# Patient Record
Sex: MALE | Race: WHITE | Employment: FULL TIME | URBAN - METROPOLITAN AREA
[De-identification: names, ages, dates, MRNs, and addresses within clinical notes are randomized per-mention and may not be internally consistent; named-entity substitution may affect disease eponyms.]

---

## 2018-06-21 ENCOUNTER — HOSPITAL ENCOUNTER (EMERGENCY)
Age: 28
Discharge: HOME | End: 2018-06-21

## 2018-06-21 VITALS
RESPIRATION RATE: 18 BRPM | TEMPERATURE: 97.9 F | HEART RATE: 73 BPM | DIASTOLIC BLOOD PRESSURE: 76 MMHG | WEIGHT: 126.06 LBS | BODY MASS INDEX: 18.67 KG/M2 | OXYGEN SATURATION: 99 % | HEIGHT: 69 IN | SYSTOLIC BLOOD PRESSURE: 120 MMHG

## 2018-06-21 DIAGNOSIS — R30.0 DYSURIA: ICD-10-CM

## 2018-06-21 DIAGNOSIS — Z20.2 EXPOSURE TO STD: Primary | ICD-10-CM

## 2018-06-21 LAB
BACTERIA: ABNORMAL /HPF
BILIRUBIN URINE: NEGATIVE
BLOOD, URINE: ABNORMAL
CLARITY: CLEAR
COLOR: YELLOW
GLUCOSE URINE: NEGATIVE MG/DL
KETONES, URINE: NEGATIVE MG/DL
LEUKOCYTE ESTERASE, URINE: ABNORMAL
NITRITE, URINE: NEGATIVE
PH UA: 6 (ref 5–9)
PROTEIN UA: NEGATIVE MG/DL
RBC UA: ABNORMAL /HPF (ref 0–2)
SPECIFIC GRAVITY UA: 1.02 (ref 1–1.03)
UROBILINOGEN, URINE: 0.2 E.U./DL
WBC UA: ABNORMAL /HPF (ref 0–5)

## 2018-06-21 PROCEDURE — 99283 EMERGENCY DEPT VISIT LOW MDM: CPT

## 2018-06-21 PROCEDURE — 87088 URINE BACTERIA CULTURE: CPT

## 2018-06-21 PROCEDURE — 87491 CHLMYD TRACH DNA AMP PROBE: CPT

## 2018-06-21 PROCEDURE — 6360000002 HC RX W HCPCS: Performed by: NURSE PRACTITIONER

## 2018-06-21 PROCEDURE — 6370000000 HC RX 637 (ALT 250 FOR IP): Performed by: NURSE PRACTITIONER

## 2018-06-21 PROCEDURE — 87591 N.GONORRHOEAE DNA AMP PROB: CPT

## 2018-06-21 PROCEDURE — 81001 URINALYSIS AUTO W/SCOPE: CPT

## 2018-06-21 PROCEDURE — 96372 THER/PROPH/DIAG INJ SC/IM: CPT

## 2018-06-21 PROCEDURE — 2500000003 HC RX 250 WO HCPCS: Performed by: NURSE PRACTITIONER

## 2018-06-21 RX ORDER — AZITHROMYCIN 250 MG/1
1000 TABLET, FILM COATED ORAL ONCE
Status: COMPLETED | OUTPATIENT
Start: 2018-06-21 | End: 2018-06-21

## 2018-06-21 RX ADMIN — AZITHROMYCIN 1000 MG: 250 TABLET, FILM COATED ORAL at 12:08

## 2018-06-21 RX ADMIN — LIDOCAINE HYDROCHLORIDE 250 MG: 10 INJECTION, SOLUTION INFILTRATION; PERINEURAL at 12:09

## 2018-06-23 LAB — URINE CULTURE, ROUTINE: NORMAL

## 2018-06-25 LAB
CHLAMYDIA TRACHOMATIS AMPLIFIED DET: NORMAL
N GONORRHOEAE AMPLIFIED DET: NORMAL

## 2021-08-06 ENCOUNTER — APPOINTMENT (OUTPATIENT)
Dept: GENERAL RADIOLOGY | Age: 31
End: 2021-08-06
Payer: COMMERCIAL

## 2021-08-06 ENCOUNTER — HOSPITAL ENCOUNTER (EMERGENCY)
Age: 31
Discharge: HOME | End: 2021-08-06
Payer: COMMERCIAL

## 2021-08-06 VITALS
OXYGEN SATURATION: 98 % | WEIGHT: 128 LBS | BODY MASS INDEX: 18.96 KG/M2 | SYSTOLIC BLOOD PRESSURE: 123 MMHG | DIASTOLIC BLOOD PRESSURE: 78 MMHG | TEMPERATURE: 97.3 F | HEART RATE: 85 BPM | RESPIRATION RATE: 16 BRPM | HEIGHT: 69 IN

## 2021-08-06 DIAGNOSIS — R07.89 ATYPICAL CHEST PAIN: Primary | ICD-10-CM

## 2021-08-06 LAB
ANION GAP SERPL CALCULATED.3IONS-SCNC: 19 MMOL/L (ref 7–16)
BUN BLDV-MCNC: 15 MG/DL (ref 6–20)
CALCIUM SERPL-MCNC: 10.2 MG/DL (ref 8.6–10.2)
CHLORIDE BLD-SCNC: 98 MMOL/L (ref 98–107)
CO2: 21 MMOL/L (ref 22–29)
CREAT SERPL-MCNC: 1.5 MG/DL (ref 0.7–1.2)
EKG ATRIAL RATE: 104 BPM
EKG P AXIS: 82 DEGREES
EKG P-R INTERVAL: 152 MS
EKG Q-T INTERVAL: 372 MS
EKG QRS DURATION: 100 MS
EKG QTC CALCULATION (BAZETT): 489 MS
EKG R AXIS: 102 DEGREES
EKG T AXIS: 73 DEGREES
EKG VENTRICULAR RATE: 104 BPM
GFR AFRICAN AMERICAN: >60
GFR NON-AFRICAN AMERICAN: 55 ML/MIN/1.73
GLUCOSE BLD-MCNC: 127 MG/DL (ref 74–99)
HCT VFR BLD CALC: 47.6 % (ref 37–54)
HEMOGLOBIN: 16.1 G/DL (ref 12.5–16.5)
MCH RBC QN AUTO: 29.2 PG (ref 26–35)
MCHC RBC AUTO-ENTMCNC: 33.8 % (ref 32–34.5)
MCV RBC AUTO: 86.4 FL (ref 80–99.9)
PDW BLD-RTO: 13.2 FL (ref 11.5–15)
PLATELET # BLD: 305 E9/L (ref 130–450)
PMV BLD AUTO: 9.9 FL (ref 7–12)
POTASSIUM SERPL-SCNC: 3 MMOL/L (ref 3.5–5)
RBC # BLD: 5.51 E12/L (ref 3.8–5.8)
SODIUM BLD-SCNC: 138 MMOL/L (ref 132–146)
TROPONIN, HIGH SENSITIVITY: <6 NG/L (ref 0–11)
WBC # BLD: 10.8 E9/L (ref 4.5–11.5)

## 2021-08-06 PROCEDURE — 85027 COMPLETE CBC AUTOMATED: CPT

## 2021-08-06 PROCEDURE — 93010 ELECTROCARDIOGRAM REPORT: CPT | Performed by: INTERNAL MEDICINE

## 2021-08-06 PROCEDURE — 80048 BASIC METABOLIC PNL TOTAL CA: CPT

## 2021-08-06 PROCEDURE — 99284 EMERGENCY DEPT VISIT MOD MDM: CPT

## 2021-08-06 PROCEDURE — 6360000002 HC RX W HCPCS: Performed by: PHYSICIAN ASSISTANT

## 2021-08-06 PROCEDURE — 84484 ASSAY OF TROPONIN QUANT: CPT

## 2021-08-06 PROCEDURE — 93005 ELECTROCARDIOGRAM TRACING: CPT | Performed by: EMERGENCY MEDICINE

## 2021-08-06 PROCEDURE — 96374 THER/PROPH/DIAG INJ IV PUSH: CPT

## 2021-08-06 PROCEDURE — 71045 X-RAY EXAM CHEST 1 VIEW: CPT

## 2021-08-06 RX ORDER — LORAZEPAM 2 MG/ML
1 INJECTION INTRAMUSCULAR ONCE
Status: COMPLETED | OUTPATIENT
Start: 2021-08-06 | End: 2021-08-06

## 2021-08-06 RX ORDER — ACETAMINOPHEN 500 MG
1000 TABLET ORAL EVERY 6 HOURS PRN
COMMUNITY
End: 2021-10-28

## 2021-08-06 RX ADMIN — LORAZEPAM 1 MG: 2 INJECTION INTRAMUSCULAR; INTRAVENOUS at 01:56

## 2021-08-06 ASSESSMENT — PAIN DESCRIPTION - FREQUENCY
FREQUENCY: INTERMITTENT
FREQUENCY: INTERMITTENT

## 2021-08-06 ASSESSMENT — PAIN DESCRIPTION - LOCATION
LOCATION: CHEST
LOCATION: CHEST

## 2021-08-06 ASSESSMENT — PAIN SCALES - GENERAL
PAINLEVEL_OUTOF10: 8
PAINLEVEL_OUTOF10: 6
PAINLEVEL_OUTOF10: 4
PAINLEVEL_OUTOF10: 4

## 2021-08-06 ASSESSMENT — PAIN DESCRIPTION - ORIENTATION
ORIENTATION: MID
ORIENTATION: MID

## 2021-08-06 ASSESSMENT — PAIN DESCRIPTION - DESCRIPTORS
DESCRIPTORS: TIGHTNESS
DESCRIPTORS: TIGHTNESS;SHARP

## 2021-08-06 ASSESSMENT — PAIN DESCRIPTION - PROGRESSION
CLINICAL_PROGRESSION: NOT CHANGED
CLINICAL_PROGRESSION: GRADUALLY IMPROVING
CLINICAL_PROGRESSION: GRADUALLY IMPROVING
CLINICAL_PROGRESSION: NOT CHANGED

## 2021-08-06 ASSESSMENT — PAIN DESCRIPTION - ONSET
ONSET: SUDDEN
ONSET: ON-GOING

## 2021-08-06 ASSESSMENT — PAIN DESCRIPTION - PAIN TYPE
TYPE: ACUTE PAIN
TYPE: ACUTE PAIN

## 2021-08-06 NOTE — ED PROVIDER NOTES
Seen independently     [facility]  Department of Emergency Medicine   ED  Encounter Note  Admit Date/RoomTime: 2021  1:12 AM  ED Room: [facility]    NAME: Denton Thomas  : 1990  MRN: [id number]     Chief Complaint:  Chest Pain (Midsternal chest pain, sudden onset x 30 min. States he's dizzy and having numbness in arms)    HISTORY OF PRESENT ILLNESS        Denton Thomas is a 35 y.o. male who presents to the ED by private vehicle for chest pain, beginning prior to arrival.  Patient states that he was driving home when he started to feel headache coming on. Patient states that he does have head. Of migraine headaches. He mentions that after the headache started he started to feel some tightness in his chest.  Patient states he has a history of getting occasional tightness in his chest but today he felt like he was becoming short of breath. He states that he might have been running a little fast and he started noticed that his fingers and lips were starting to tingle and go slightly numb. He states that he also noticed that his hands were cramping during this time. Patient states that currently he is chest pain is improving but he still has a little bit of mid chest pain. He states he no longer has a headache. Patient says she just feels very anxious but is unsure what is going on. Patient denies any other significant medical history other than the migraines and celiac disease. Patient states that he smokes about half pack a day of cigarettes. Patient does not use alcohol or street drugs. Patient denies any fevers or chills. ROS   Pertinent positives and negatives are stated within HPI, all other systems reviewed and are negative. Past Medical History:  has a past medical history of Celiac disease, Hypoglycemia, and Migraines. Surgical History:  has no past surgical history on file. Social History:  reports that he has been smoking cigarettes.  He has been smoking about 0.50 packs per day. He has never used smokeless tobacco. He reports that he does not drink alcohol and does not use drugs. Family History: family history is not on file. Allergies: Patient has no known allergies. PHYSICAL EXAM   Oxygen Saturation Interpretation: Normal on room air analysis. ED Triage Vitals   BP Temp Temp Source Pulse Resp SpO2 Height Weight   08/06/21 0047 08/06/21 0047 08/06/21 0047 08/06/21 0042 08/06/21 0042 08/06/21 0042 08/06/21 0047 08/06/21 0047   (!) 129/93 97.4 °F (36.3 °C) Temporal 125 20 99 % 5' 9\" (1.753 m) 128 lb (58.1 kg)         Physical Exam  Constitutional/General: Alert and oriented x3, anxious  HEENT:  NC/NT. PERRLA,  Airway patent. Neck: Supple, full ROM  Respiratory: Lungs clear to auscultation bilaterally  CV:  Regular rate. Regular rhythm. No murmurs, gallops, or rubs. 2+ distal pulses  Chest: Anterior chest wall tenderness. GI:  Abdomen Soft, Non tender, Non distended. +BS. Musculoskeletal: Moves all extremities x 4. Warm and well perfused, patient had intact sensation in bilateral upper extremities on my examination. Intact  strength bilateral upper extremities. Integument: skin warm and dry. No rashes.    Lymphatic: no lymphadenopathy noted  Neurologic: GCS 15, no focal deficits, symmetric strength 5/5 in the upper and lower extremities bilaterally  Psychiatric: Anxious      Lab / Imaging Results   (All laboratory and radiology results have been personally reviewed by myself)  Labs:  Results for orders placed or performed during the hospital encounter of 48/15/58   Basic metabolic panel   Result Value Ref Range    Sodium 138 132 - 146 mmol/L    Potassium 3.0 (L) 3.5 - 5.0 mmol/L    Chloride 98 98 - 107 mmol/L    CO2 21 (L) 22 - 29 mmol/L    Anion Gap 19 (H) 7 - 16 mmol/L    Glucose 127 (H) 74 - 99 mg/dL    BUN 15 6 - 20 mg/dL    CREATININE 1.5 (H) 0.7 - 1.2 mg/dL    GFR Non-African American 55 >=60 mL/min/1.73    GFR African American >60     Calcium 10.2 8.6 - 10.2 mg/dL   CBC   Result Value Ref Range    WBC 10.8 4.5 - 11.5 E9/L    RBC 5.51 3.80 - 5.80 E12/L    Hemoglobin 16.1 12.5 - 16.5 g/dL    Hematocrit 47.6 37.0 - 54.0 %    MCV 86.4 80.0 - 99.9 fL    MCH 29.2 26.0 - 35.0 pg    MCHC 33.8 32.0 - 34.5 %    RDW 13.2 11.5 - 15.0 fL    Platelets 137 626 - 772 E9/L    MPV 9.9 7.0 - 12.0 fL   Troponin   Result Value Ref Range    Troponin, High Sensitivity <6 0 - 11 ng/L     Imaging: All Radiology results interpreted by Radiologist unless otherwise noted. XR CHEST PORTABLE   Final Result   No acute process. EKG #1:  Interpreted by emergency department attending physician unless otherwise noted. 8/6/21  Time: 0:41    Rhythm: sinus tachycardia  Rate: 100-110  Axis: normal  Conduction: normal  ST Segments: no acute change  T Waves: no acute change    Clinical Impression: Sinus tachycardia  Comparison to Prior tracings: There are no previous tracings available for comparison. ED Course / Medical Decision Making     Medications   LORazepam (ATIVAN) injection 1 mg (1 mg Intravenous Given 8/6/21 0156)        Re-Evaluations:  8/6/21      Time: 2:25    Patients condition is improving. Consultations:             None    Procedures:   none    MDM:  2:25  Reevaluated patient at this time patient states he feels much better. Patient heart rate has now come down to the 80s. On my reevaluation it was 85 bpm.  Patient's chest pain is reproducible so is likely atypical musculoskeletal chest pain. Patient was seen and staffed with attending physician. Patient is in agreement with discharge at this time. Plan of Care/Counseling:  Slime Blanc PA-C reviewed today's visit with the patient in addition to providing specific details for the plan of care and counseling regarding the diagnosis and prognosis. Questions are answered at this time and are agreeable with the plan. ASSESSMENT     1.  Atypical chest pain      This patient's ED course included: a personal history and physicial examination, re-evaluation prior to disposition and multiple bedside re-evaluations  This patient has remained hemodynamically stable and improved during their ED course. PLAN   Discharged home. Patient condition is good. New Medications     New Prescriptions    No medications on file     Electronically signed by Paula Gardner   DD: 8/6/21  **This report was transcribed using voice recognition software. Every effort was made to ensure accuracy; however, inadvertent computerized transcription errors may be present.   END OF PROVIDER NOTE       Christoph Alberto PA-C  08/06/21 0682

## 2021-08-06 NOTE — ED NOTES
Patient has been marked ready for discharge by HayesJose Carlosma. Patient and visitor updated on plan to discharge. Patient A&OX4, respirations non-labored, skin warm/dry, no distress noted. Patient reports chest pain is improved. Patient calm at present.       Wayna Mcardle, RN  08/06/21 1349

## 2021-08-13 ENCOUNTER — HOSPITAL ENCOUNTER (EMERGENCY)
Age: 31
Discharge: HOME | End: 2021-08-13
Attending: EMERGENCY MEDICINE
Payer: COMMERCIAL

## 2021-08-13 ENCOUNTER — APPOINTMENT (OUTPATIENT)
Dept: GENERAL RADIOLOGY | Age: 31
End: 2021-08-13
Payer: COMMERCIAL

## 2021-08-13 ENCOUNTER — APPOINTMENT (OUTPATIENT)
Dept: CT IMAGING | Age: 31
End: 2021-08-13
Payer: COMMERCIAL

## 2021-08-13 VITALS
RESPIRATION RATE: 16 BRPM | TEMPERATURE: 97.8 F | BODY MASS INDEX: 18.51 KG/M2 | OXYGEN SATURATION: 99 % | SYSTOLIC BLOOD PRESSURE: 124 MMHG | HEIGHT: 69 IN | WEIGHT: 125 LBS | DIASTOLIC BLOOD PRESSURE: 85 MMHG | HEART RATE: 76 BPM

## 2021-08-13 DIAGNOSIS — R07.9 CHEST PAIN, UNSPECIFIED TYPE: Primary | ICD-10-CM

## 2021-08-13 DIAGNOSIS — R51.9 NONINTRACTABLE HEADACHE, UNSPECIFIED CHRONICITY PATTERN, UNSPECIFIED HEADACHE TYPE: ICD-10-CM

## 2021-08-13 DIAGNOSIS — E87.6 HYPOKALEMIA: ICD-10-CM

## 2021-08-13 LAB
ANION GAP SERPL CALCULATED.3IONS-SCNC: 14 MMOL/L (ref 7–16)
BASOPHILS ABSOLUTE: 0.05 E9/L (ref 0–0.2)
BASOPHILS RELATIVE PERCENT: 0.7 % (ref 0–2)
BUN BLDV-MCNC: 17 MG/DL (ref 6–20)
CALCIUM SERPL-MCNC: 9.5 MG/DL (ref 8.6–10.2)
CHLORIDE BLD-SCNC: 98 MMOL/L (ref 98–107)
CO2: 23 MMOL/L (ref 22–29)
CREAT SERPL-MCNC: 0.9 MG/DL (ref 0.7–1.2)
EKG ATRIAL RATE: 58 BPM
EKG P AXIS: 68 DEGREES
EKG P-R INTERVAL: 134 MS
EKG Q-T INTERVAL: 402 MS
EKG QRS DURATION: 100 MS
EKG QTC CALCULATION (BAZETT): 394 MS
EKG R AXIS: 104 DEGREES
EKG T AXIS: 62 DEGREES
EKG VENTRICULAR RATE: 58 BPM
EOSINOPHILS ABSOLUTE: 0.14 E9/L (ref 0.05–0.5)
EOSINOPHILS RELATIVE PERCENT: 2 % (ref 0–6)
GFR AFRICAN AMERICAN: >60
GFR NON-AFRICAN AMERICAN: >60 ML/MIN/1.73
GLUCOSE BLD-MCNC: 103 MG/DL (ref 74–99)
HCT VFR BLD CALC: 47.3 % (ref 37–54)
HEMOGLOBIN: 15.9 G/DL (ref 12.5–16.5)
IMMATURE GRANULOCYTES #: 0.03 E9/L
IMMATURE GRANULOCYTES %: 0.4 % (ref 0–5)
LYMPHOCYTES ABSOLUTE: 3.23 E9/L (ref 1.5–4)
LYMPHOCYTES RELATIVE PERCENT: 46.3 % (ref 20–42)
MCH RBC QN AUTO: 29.2 PG (ref 26–35)
MCHC RBC AUTO-ENTMCNC: 33.6 % (ref 32–34.5)
MCV RBC AUTO: 86.8 FL (ref 80–99.9)
MONOCYTES ABSOLUTE: 0.53 E9/L (ref 0.1–0.95)
MONOCYTES RELATIVE PERCENT: 7.6 % (ref 2–12)
NEUTROPHILS ABSOLUTE: 2.99 E9/L (ref 1.8–7.3)
NEUTROPHILS RELATIVE PERCENT: 43 % (ref 43–80)
PDW BLD-RTO: 13.1 FL (ref 11.5–15)
PLATELET # BLD: 268 E9/L (ref 130–450)
PMV BLD AUTO: 9.7 FL (ref 7–12)
POTASSIUM SERPL-SCNC: 3.3 MMOL/L (ref 3.5–5)
RBC # BLD: 5.45 E12/L (ref 3.8–5.8)
SODIUM BLD-SCNC: 135 MMOL/L (ref 132–146)
TROPONIN, HIGH SENSITIVITY: <6 NG/L (ref 0–11)
TROPONIN, HIGH SENSITIVITY: <6 NG/L (ref 0–11)
WBC # BLD: 7 E9/L (ref 4.5–11.5)

## 2021-08-13 PROCEDURE — 36415 COLL VENOUS BLD VENIPUNCTURE: CPT

## 2021-08-13 PROCEDURE — 85025 COMPLETE CBC W/AUTO DIFF WBC: CPT

## 2021-08-13 PROCEDURE — 71045 X-RAY EXAM CHEST 1 VIEW: CPT

## 2021-08-13 PROCEDURE — 93010 ELECTROCARDIOGRAM REPORT: CPT | Performed by: INTERNAL MEDICINE

## 2021-08-13 PROCEDURE — 6370000000 HC RX 637 (ALT 250 FOR IP): Performed by: STUDENT IN AN ORGANIZED HEALTH CARE EDUCATION/TRAINING PROGRAM

## 2021-08-13 PROCEDURE — 70450 CT HEAD/BRAIN W/O DYE: CPT

## 2021-08-13 PROCEDURE — 80048 BASIC METABOLIC PNL TOTAL CA: CPT

## 2021-08-13 PROCEDURE — 99283 EMERGENCY DEPT VISIT LOW MDM: CPT

## 2021-08-13 PROCEDURE — 2580000003 HC RX 258: Performed by: STUDENT IN AN ORGANIZED HEALTH CARE EDUCATION/TRAINING PROGRAM

## 2021-08-13 PROCEDURE — 96374 THER/PROPH/DIAG INJ IV PUSH: CPT

## 2021-08-13 PROCEDURE — 96375 TX/PRO/DX INJ NEW DRUG ADDON: CPT

## 2021-08-13 PROCEDURE — 6360000002 HC RX W HCPCS: Performed by: STUDENT IN AN ORGANIZED HEALTH CARE EDUCATION/TRAINING PROGRAM

## 2021-08-13 PROCEDURE — 84484 ASSAY OF TROPONIN QUANT: CPT

## 2021-08-13 PROCEDURE — 93005 ELECTROCARDIOGRAM TRACING: CPT | Performed by: EMERGENCY MEDICINE

## 2021-08-13 RX ORDER — METOCLOPRAMIDE HYDROCHLORIDE 5 MG/ML
10 INJECTION INTRAMUSCULAR; INTRAVENOUS ONCE
Status: COMPLETED | OUTPATIENT
Start: 2021-08-13 | End: 2021-08-13

## 2021-08-13 RX ORDER — POTASSIUM CHLORIDE 20 MEQ/1
40 TABLET, EXTENDED RELEASE ORAL ONCE
Status: COMPLETED | OUTPATIENT
Start: 2021-08-13 | End: 2021-08-13

## 2021-08-13 RX ORDER — 0.9 % SODIUM CHLORIDE 0.9 %
1000 INTRAVENOUS SOLUTION INTRAVENOUS ONCE
Status: COMPLETED | OUTPATIENT
Start: 2021-08-13 | End: 2021-08-13

## 2021-08-13 RX ORDER — DIPHENHYDRAMINE HYDROCHLORIDE 50 MG/ML
25 INJECTION INTRAMUSCULAR; INTRAVENOUS ONCE
Status: COMPLETED | OUTPATIENT
Start: 2021-08-13 | End: 2021-08-13

## 2021-08-13 RX ADMIN — DIPHENHYDRAMINE HYDROCHLORIDE 25 MG: 50 INJECTION, SOLUTION INTRAMUSCULAR; INTRAVENOUS at 06:40

## 2021-08-13 RX ADMIN — METOCLOPRAMIDE HYDROCHLORIDE 10 MG: 5 INJECTION INTRAMUSCULAR; INTRAVENOUS at 06:40

## 2021-08-13 RX ADMIN — POTASSIUM CHLORIDE 40 MEQ: 20 TABLET, EXTENDED RELEASE ORAL at 06:41

## 2021-08-13 RX ADMIN — SODIUM CHLORIDE 1000 ML: 9 INJECTION, SOLUTION INTRAVENOUS at 06:40

## 2021-08-13 ASSESSMENT — ENCOUNTER SYMPTOMS
VOMITING: 0
COUGH: 0
NAUSEA: 0
SINUS PRESSURE: 0
SHORTNESS OF BREATH: 0
EYE DISCHARGE: 0
DIARRHEA: 0
BACK PAIN: 0
SORE THROAT: 0
EYE PAIN: 0
ABDOMINAL PAIN: 0
EYE REDNESS: 0
WHEEZING: 0

## 2021-08-13 ASSESSMENT — PAIN DESCRIPTION - PAIN TYPE: TYPE: ACUTE PAIN

## 2021-08-13 ASSESSMENT — PAIN DESCRIPTION - DESCRIPTORS: DESCRIPTORS: DISCOMFORT

## 2021-08-13 ASSESSMENT — PAIN DESCRIPTION - LOCATION: LOCATION: CHEST

## 2021-08-13 ASSESSMENT — PAIN DESCRIPTION - ORIENTATION: ORIENTATION: LEFT

## 2021-08-13 ASSESSMENT — PAIN SCALES - GENERAL: PAINLEVEL_OUTOF10: 5

## 2021-08-13 NOTE — ED PROVIDER NOTES
frequency. Musculoskeletal: Negative for arthralgias and back pain. Skin: Negative for rash and wound. Neurological: Positive for weakness and headaches. Negative for dizziness, seizures, facial asymmetry and numbness. Hematological: Negative for adenopathy. All other systems reviewed and are negative. Physical Exam  Vitals and nursing note reviewed. Constitutional:       Appearance: He is well-developed. HENT:      Head: Normocephalic and atraumatic. Eyes:      Conjunctiva/sclera: Conjunctivae normal.   Cardiovascular:      Rate and Rhythm: Normal rate and regular rhythm. Heart sounds: Normal heart sounds. No murmur heard. Pulmonary:      Effort: Pulmonary effort is normal. No respiratory distress. Breath sounds: Normal breath sounds. No wheezing or rales. Abdominal:      General: Bowel sounds are normal.      Palpations: Abdomen is soft. Tenderness: There is no abdominal tenderness. There is no guarding or rebound. Musculoskeletal:         General: No tenderness or deformity. Cervical back: Normal range of motion and neck supple. Skin:     General: Skin is warm and dry. Neurological:      General: No focal deficit present. Mental Status: He is alert and oriented to person, place, and time. Cranial Nerves: No cranial nerve deficit. Sensory: No sensory deficit. Motor: No weakness. Coordination: Coordination normal.          Procedures   EKG #1:  Interpreted by emergency department physician unless otherwise noted. Time:  436    Rate: 58  Rhythm: Sinus. Interpretation: EKG obtained demonstrates sinus bradycardia, rate 58, right axis deviation, , no acute ST segment changes. Comparison: stable as compared to patient's most recent EKG.       MDM  Number of Diagnoses or Management Options  Chest pain, unspecified type  Hypokalemia  Nonintractable headache, unspecified chronicity pattern, unspecified headache type  Diagnosis management comments: Patient is a 42-year-old male past med history tobacco abuse. Patient presented to complain of chest pain and headache. Vital signs stable presentation. On physical exam heart regular rate and rhythm, lungs clear to auscultation bilaterally, abdomen soft nontender. On neurological exam no focal deficits, strength 5 out of 5 to bilateral upper and lower extremities, sensation intact to light touch. EKG obtained demonstrate no acute ischemic changes. Laboratory work obtained CBC unremarkable, BMP demonstrated mild hypokalemia 3.3, troponin less than 6x2. Chest x-ray demonstrate no acute abnormalities, CT scan of the head demonstrated no acute abnormalities. Patient given 1 L normal saline, Benadryl, Reglan and potassium for hyperkalemia. On reevaluation patient notes improvement in symptoms. Findings consistent with chest pain likely migraine headache. Decision made to discharge patient. Patient was instructed to follow-up with primary care doctor given neurology referral.  In addition if patient notes any new worrisome symptoms he was instructed to return to the emergency department for evaluation. Plan of care discussed with patient occluding discharge, all questions were answered, patient was in agreement plan of care and discharged home in stable condition.        Amount and/or Complexity of Data Reviewed  Clinical lab tests: ordered and reviewed  Tests in the radiology section of CPT®: ordered and reviewed  Decide to obtain previous medical records or to obtain history from someone other than the patient: yes    Risk of Complications, Morbidity, and/or Mortality  Presenting problems: moderate  Diagnostic procedures: moderate  Management options: moderate    Patient Progress  Patient progress: stable           --------------------------------------------- PAST HISTORY ---------------------------------------------  Past Medical History:  has a past medical history of Celiac disease, Hypoglycemia, and Migraines. Past Surgical History:  has no past surgical history on file. Social History:  reports that he has been smoking cigarettes. He has been smoking about 0.50 packs per day. He has never used smokeless tobacco. He reports current drug use. Drug: Marijuana. He reports that he does not drink alcohol. Family History: family history is not on file. The patients home medications have been reviewed. Allergies: Patient has no known allergies.     -------------------------------------------------- RESULTS -------------------------------------------------  Labs:  Results for orders placed or performed during the hospital encounter of 08/13/21   CBC auto differential   Result Value Ref Range    WBC 7.0 4.5 - 11.5 E9/L    RBC 5.45 3.80 - 5.80 E12/L    Hemoglobin 15.9 12.5 - 16.5 g/dL    Hematocrit 47.3 37.0 - 54.0 %    MCV 86.8 80.0 - 99.9 fL    MCH 29.2 26.0 - 35.0 pg    MCHC 33.6 32.0 - 34.5 %    RDW 13.1 11.5 - 15.0 fL    Platelets 912 031 - 615 E9/L    MPV 9.7 7.0 - 12.0 fL    Neutrophils % 43.0 43.0 - 80.0 %    Immature Granulocytes % 0.4 0.0 - 5.0 %    Lymphocytes % 46.3 (H) 20.0 - 42.0 %    Monocytes % 7.6 2.0 - 12.0 %    Eosinophils % 2.0 0.0 - 6.0 %    Basophils % 0.7 0.0 - 2.0 %    Neutrophils Absolute 2.99 1.80 - 7.30 E9/L    Immature Granulocytes # 0.03 E9/L    Lymphocytes Absolute 3.23 1.50 - 4.00 E9/L    Monocytes Absolute 0.53 0.10 - 0.95 E9/L    Eosinophils Absolute 0.14 0.05 - 0.50 E9/L    Basophils Absolute 0.05 0.00 - 0.20 K3/O   Basic Metabolic Panel   Result Value Ref Range    Sodium 135 132 - 146 mmol/L    Potassium 3.3 (L) 3.5 - 5.0 mmol/L    Chloride 98 98 - 107 mmol/L    CO2 23 22 - 29 mmol/L    Anion Gap 14 7 - 16 mmol/L    Glucose 103 (H) 74 - 99 mg/dL    BUN 17 6 - 20 mg/dL    CREATININE 0.9 0.7 - 1.2 mg/dL    GFR Non-African American >60 >=60 mL/min/1.73    GFR African American >60     Calcium 9.5 8.6 - 10.2 mg/dL   Troponin   Result Value Ref Range Troponin, High Sensitivity <6 0 - 11 ng/L   Troponin   Result Value Ref Range    Troponin, High Sensitivity <6 0 - 11 ng/L   EKG 12 Lead   Result Value Ref Range    Ventricular Rate 58 BPM    Atrial Rate 58 BPM    P-R Interval 134 ms    QRS Duration 100 ms    Q-T Interval 402 ms    QTc Calculation (Bazett) 394 ms    P Axis 68 degrees    R Axis 104 degrees    T Axis 62 degrees       Radiology:  CT Head WO Contrast   Final Result   1. There is no no acute intracranial abnormality. Specifically, there is no   intracranial hemorrhage. 2.  Bony opacification of the left frontal sinus as well as bony thickening   of the roof of the left orbit. 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 XR CHEST PORTABLE   Final Result   No pneumonia, pneumothorax or CHF. Pulmonary hyperinflation.             ------------------------- NURSING NOTES AND VITALS REVIEWED ---------------------------  Date / Time Roomed:  8/13/2021  6:00 AM  ED Bed Assignment:  GARLAND D/KOURTNEY    The nursing notes within the ED encounter and vital signs as below have been reviewed. /85   Pulse 76   Temp 97.8 °F (36.6 °C)   Resp 16   Ht 5' 9\" (1.753 m)   Wt 125 lb (56.7 kg)   SpO2 99%   BMI 18.46 kg/m²   Oxygen Saturation Interpretation: Normal      ------------------------------------------ PROGRESS NOTES ------------------------------------------  7:55 AM EDT  I have spoken with the patient and discussed todays results, in addition to providing specific details for the plan of care and counseling regarding the diagnosis and prognosis. Their questions are answered at this time and they are agreeable with the plan. I discussed at length with them reasons for immediate return here for re evaluation.  They will followup with their primary care physician by calling their office on Monday.      --------------------------------- ADDITIONAL PROVIDER NOTES ---------------------------------  At this time the patient is without objective evidence of an acute process requiring hospitalization or inpatient management. They have remained hemodynamically stable throughout their entire ED visit and are stable for discharge with outpatient follow-up. The plan has been discussed in detail and they are aware of the specific conditions for emergent return, as well as the importance of follow-up. New Prescriptions    No medications on file       Diagnosis:  1. Chest pain, unspecified type    2. Nonintractable headache, unspecified chronicity pattern, unspecified headache type        Disposition:  Patient's disposition: Discharge to home  Patient's condition is stable. Patient was seen and evaluated by myself and my attending Tricia Nguyen DO. Assessment and Plan discussed with attending provider, please see attestation for final plan of care.      DO Christa Harmon DO  Resident  08/13/21 1852

## 2021-08-16 ENCOUNTER — OFFICE VISIT (OUTPATIENT)
Dept: FAMILY MEDICINE CLINIC | Age: 31
End: 2021-08-16
Payer: COMMERCIAL

## 2021-08-16 VITALS
HEIGHT: 69 IN | BODY MASS INDEX: 17.77 KG/M2 | WEIGHT: 120 LBS | DIASTOLIC BLOOD PRESSURE: 82 MMHG | HEART RATE: 96 BPM | OXYGEN SATURATION: 98 % | SYSTOLIC BLOOD PRESSURE: 134 MMHG | TEMPERATURE: 99.2 F | RESPIRATION RATE: 18 BRPM

## 2021-08-16 DIAGNOSIS — G43.909 MIGRAINE WITHOUT STATUS MIGRAINOSUS, NOT INTRACTABLE, UNSPECIFIED MIGRAINE TYPE: ICD-10-CM

## 2021-08-16 DIAGNOSIS — H53.8 BLURRY VISION: ICD-10-CM

## 2021-08-16 DIAGNOSIS — R93.0 ABNORMAL HEAD CT: Primary | ICD-10-CM

## 2021-08-16 PROCEDURE — 1111F DSCHRG MED/CURRENT MED MERGE: CPT | Performed by: FAMILY MEDICINE

## 2021-08-16 PROCEDURE — 6360000002 HC RX W HCPCS

## 2021-08-16 PROCEDURE — 99203 OFFICE O/P NEW LOW 30 MIN: CPT | Performed by: FAMILY MEDICINE

## 2021-08-16 PROCEDURE — 99202 OFFICE O/P NEW SF 15 MIN: CPT | Performed by: FAMILY MEDICINE

## 2021-08-16 RX ORDER — PROCHLORPERAZINE MALEATE 10 MG
10 TABLET ORAL EVERY 6 HOURS PRN
Qty: 28 TABLET | Refills: 0 | Status: SHIPPED
Start: 2021-08-16 | End: 2022-01-18

## 2021-08-16 RX ORDER — KETOROLAC TROMETHAMINE 15 MG/ML
15 INJECTION, SOLUTION INTRAMUSCULAR; INTRAVENOUS ONCE
Qty: 1 ML | Refills: 0
Start: 2021-08-16 | End: 2021-08-16 | Stop reason: CLARIF

## 2021-08-16 RX ORDER — KETOROLAC TROMETHAMINE 30 MG/ML
30 INJECTION, SOLUTION INTRAMUSCULAR; INTRAVENOUS ONCE
Status: COMPLETED | OUTPATIENT
Start: 2021-08-16 | End: 2021-08-16

## 2021-08-16 RX ADMIN — KETOROLAC TROMETHAMINE 30 MG: 30 INJECTION, SOLUTION INTRAMUSCULAR; INTRAVENOUS at 15:10

## 2021-08-16 SDOH — ECONOMIC STABILITY: FOOD INSECURITY: WITHIN THE PAST 12 MONTHS, YOU WORRIED THAT YOUR FOOD WOULD RUN OUT BEFORE YOU GOT MONEY TO BUY MORE.: NEVER TRUE

## 2021-08-16 SDOH — ECONOMIC STABILITY: TRANSPORTATION INSECURITY
IN THE PAST 12 MONTHS, HAS THE LACK OF TRANSPORTATION KEPT YOU FROM MEDICAL APPOINTMENTS OR FROM GETTING MEDICATIONS?: NO

## 2021-08-16 SDOH — ECONOMIC STABILITY: FOOD INSECURITY: WITHIN THE PAST 12 MONTHS, THE FOOD YOU BOUGHT JUST DIDN'T LAST AND YOU DIDN'T HAVE MONEY TO GET MORE.: NEVER TRUE

## 2021-08-16 SDOH — ECONOMIC STABILITY: TRANSPORTATION INSECURITY
IN THE PAST 12 MONTHS, HAS LACK OF TRANSPORTATION KEPT YOU FROM MEETINGS, WORK, OR FROM GETTING THINGS NEEDED FOR DAILY LIVING?: NO

## 2021-08-16 SDOH — ECONOMIC STABILITY: HOUSING INSECURITY: IN THE LAST 12 MONTHS, HOW MANY PLACES HAVE YOU LIVED?: 1

## 2021-08-16 SDOH — ECONOMIC STABILITY: HOUSING INSECURITY
IN THE LAST 12 MONTHS, WAS THERE A TIME WHEN YOU DID NOT HAVE A STEADY PLACE TO SLEEP OR SLEPT IN A SHELTER (INCLUDING NOW)?: NO

## 2021-08-16 SDOH — ECONOMIC STABILITY: INCOME INSECURITY: IN THE LAST 12 MONTHS, WAS THERE A TIME WHEN YOU WERE NOT ABLE TO PAY THE MORTGAGE OR RENT ON TIME?: NO

## 2021-08-16 ASSESSMENT — ENCOUNTER SYMPTOMS
COUGH: 0
DIARRHEA: 0
SHORTNESS OF BREATH: 0
VOMITING: 1
WHEEZING: 0
SINUS PAIN: 0
FACIAL SWELLING: 0
RHINORRHEA: 0
SINUS PRESSURE: 0
ABDOMINAL PAIN: 0
PHOTOPHOBIA: 1
NAUSEA: 1
TROUBLE SWALLOWING: 0

## 2021-08-16 ASSESSMENT — PATIENT HEALTH QUESTIONNAIRE - PHQ9
SUM OF ALL RESPONSES TO PHQ9 QUESTIONS 1 & 2: 0
SUM OF ALL RESPONSES TO PHQ QUESTIONS 1-9: 0
1. LITTLE INTEREST OR PLEASURE IN DOING THINGS: 0
2. FEELING DOWN, DEPRESSED OR HOPELESS: 0

## 2021-08-16 ASSESSMENT — SOCIAL DETERMINANTS OF HEALTH (SDOH): HOW HARD IS IT FOR YOU TO PAY FOR THE VERY BASICS LIKE FOOD, HOUSING, MEDICAL CARE, AND HEATING?: NOT HARD AT ALL

## 2021-08-16 ASSESSMENT — LIFESTYLE VARIABLES: HOW OFTEN DO YOU HAVE A DRINK CONTAINING ALCOHOL: NEVER

## 2021-08-16 NOTE — PROGRESS NOTES
S: 35 y.o. male presents today for Migraine (at least 5 times a week), Follow-Up from Hospital (went to [facility] last week, sharp headache, chest pain, legs and arms went numb, getting vertigo, vomited once yesterday), and New Patient    Establish care  F/u hospital 8/6 and 8/10 severe HA with associated symptoms, see ED notes  Hx of possible celiac disease  Hx of migraines since 16years old: had MRI x2 wnl; worsening migraines in his 20's, never treated medically and they have worsened lately in intensity and quantity  Has had consistent HA since these ED visits and has persistent blurry vision with tearing of his eye, photo/phonophobia    O: VS: /82   Pulse 96   Temp 99.2 °F (37.3 °C)   Resp 18   Ht 5' 9\" (1.753 m)   Wt 120 lb (54.4 kg)   SpO2 98%   BMI 17.72 kg/m²   AAO/NAD, appropriate affect for mood  Eyes: PERRL; corrective saccad; l eye deviation with eye cover test  CV:  RRR, no murmur  Resp: CTAB  Abdomen: SNTND  Ext: no edema  Neuro: motor, sensation intact; CNII-XII intact except as noted in eyes    Assessment/Plan:   1) Establish care   2) Migraines - MRI brain / MRA brain  3) status migrainosus - compazine and toradol today  4) Bony thickening of the roof of left orbit - follow-up with ophtho    RTO: 2-3 weeks f/u    Attending Physician Statement  I have discussed the case, including pertinent history and exam findings with the resident. I agree with the documented assessment and plan.       Electronically signed by Dante Morton MD on 8/16/2021 at 4:30 PM

## 2021-08-16 NOTE — PROGRESS NOTES
[facility]  FAMILY MEDICINE RESIDENCY PROGRAM  DATE OF VISIT : 2021    Patient : Brunilda Mcfadden   Age : 35 y.o.  : 1990   MRN : <[id number]>   ______________________________________________________________________    Chief Complaint :   Chief Complaint   Patient presents with    Migraine     at least 5 times a week    Follow-Up from [facility]     went [email or web address] week, sharp headache, chest pain, legs and arms went numb, getting vertigo, vomited once yesterday    New Patient       HPI : Brunilda Mcfadden is 35 y.o. male who presented to the clinic today for establishing care and migraines. Celiac disease: diagnosed around 16, but now eats gluten again and is fine. Migraine headaches: previous history since 15yo, multiple MRIs done during teenage years, all normal. Really bad around mid 25s, was never checked. Most frequent lately, and worsening. On  was seen in ED for headache, numbness, dizziness and chest pain. Ed work-up was negative for intracranial abnormalities or cardiac origin. He was given ativan 1mg with improvement of symptoms. Seen again on  patient was seen again for similar symptoms. Has been having headaches daily since ED. Headaches have been starting on the neck or temporal region, the radiates around and causes photophobia, phonophobia, causes eyes to tear (particularly lateralized to initial pain). When headache >8/10 experiences numbness and weakness. Does endorse consistent blurry vision. Depression screening negative. Does endorse poor sleep hygiene. I reviewed the patient's past medications, allergies and past medical history during this visit. Past Medical History :        Past Medical History:   Diagnosis Date    Celiac disease     Hypoglycemia     Migraine headache with aura      History reviewed. No pertinent surgical history.     Social History :  Social History     Tobacco History     Smoking Status  Former Smoker Quit date  4/14/2021 Smoking Frequency  0.5 packs/day Smoking Tobacco Type  Cigarettes    Smokeless Tobacco Use  Never Used          Alcohol History     Alcohol Use Status  No          Drug Use     Drug Use Status  Not Currently Types  Marijuana          Sexual Activity     Sexually Active  Not Currently                 Allergies :   No Known Allergies    Medication List :    Current Outpatient Medications   Medication Sig Dispense Refill    prochlorperazine (COMPAZINE) 10 MG tablet Take 1 tablet by mouth every 6 hours as needed (nausea and vomiting) 28 tablet 0    acetaminophen (TYLENOL) 500 MG tablet Take 1,000 mg by mouth every 6 hours as needed for Pain       Current Facility-Administered Medications   Medication Dose Route Frequency Provider Last Rate Last Admin    ketorolac (TORADOL) injection 30 mg  30 mg Intramuscular Once Joanne Gamino MD            Review of Systems :  Review of Systems   Constitutional: Negative for chills, fatigue and fever. HENT: Negative for congestion, dental problem, facial swelling, rhinorrhea, sinus pressure, sinus pain, tinnitus and trouble swallowing. Eyes: Positive for photophobia. Negative for visual disturbance. Respiratory: Negative for cough, shortness of breath and wheezing. Cardiovascular: Negative for chest pain, palpitations and leg swelling. Gastrointestinal: Positive for nausea and vomiting. Negative for abdominal pain and diarrhea. Neurological: Positive for dizziness, weakness, light-headedness, numbness and headaches. Negative for seizures, syncope and speech difficulty. ______________________________________________________________________    Physical Exam :    Vitals: /82   Pulse 96   Temp 99.2 °F (37.3 °C)   Resp 18   Ht 5' 9\" (1.753 m)   Wt 120 lb (54.4 kg)   SpO2 98%   BMI 17.72 kg/m²   Physical Exam  Vitals reviewed. Constitutional:       General: He is not in acute distress. Appearance: Normal appearance.  He is normal weight. HENT:      Head: Normocephalic and atraumatic. Right Ear: Tympanic membrane, ear canal and external ear normal.      Left Ear: Tympanic membrane, ear canal and external ear normal.      Nose: No congestion or rhinorrhea. Mouth/Throat:      Mouth: Mucous membranes are moist.      Pharynx: No oropharyngeal exudate or posterior oropharyngeal erythema. Eyes:      Conjunctiva/sclera: Conjunctivae normal.      Pupils: Pupils are equal, round, and reactive to light. Funduscopic exam:     Right eye: No hemorrhage or papilledema. Red reflex present. Left eye: No hemorrhage or papilledema. Red reflex present. Visual Fields: Right eye visual fields normal and left eye visual fields normal.   Cardiovascular:      Rate and Rhythm: Normal rate and regular rhythm. Pulses: Normal pulses. Heart sounds: Normal heart sounds. No murmur heard. Pulmonary:      Effort: Pulmonary effort is normal. No respiratory distress. Breath sounds: Normal breath sounds. No wheezing. Abdominal:      General: Bowel sounds are normal. There is no distension. Palpations: Abdomen is soft. Tenderness: There is no abdominal tenderness. Musculoskeletal:      Cervical back: Normal range of motion and neck supple. No rigidity. Right lower leg: No edema. Left lower leg: No edema. Neurological:      General: No focal deficit present. Mental Status: He is alert. Cranial Nerves: No cranial nerve deficit. Sensory: No sensory deficit. Motor: No weakness. Coordination: Coordination normal.      Deep Tendon Reflexes: Reflexes normal.      Comments: Corrective saccade left eye with lateral gaze  Abnormal cover-uncover test of left eye with medial deviation     ___________________    Assessment & Plan :    1.  Migraine without status migrainosus, not intractable, unspecified migraine type  - started about 2 weeks ago, not resolving  - neurological symptoms: Corrective saccade left eye with lateral gaze  Abnormal cover-uncover test of left eye with medial deviation  - Discussed with case with neurology  - AR DISCHARGE MEDS RECONCILED W/ CURRENT OUTPATIENT MED LIST  - prochlorperazine (COMPAZINE) 10 MG tablet; Take 1 tablet by mouth every 6 hours as needed (nausea and vomiting)  Dispense: 28 tablet; Refill: 0  - MRI BRAIN W WO CONTRAST; Future  - MRA HEAD W CONTRAST; Future  - [address] [address], APN, Neurology, [place]  - ketorolac (TORADOL) injection 30 mg    2. Abnormal head CT- bony opacification left orbit  3. Leila Vasquez MD, Ophthalmology, [place] ([facility])        Additional plan and future considerations:   RTO in 2-3 weeks for migraine fu    Return to Office: Return in 2 weeks (on 8/30/2021).     Darylene Athens, MD   Case discussed with Dr. Martha Hurst

## 2021-08-20 ENCOUNTER — TELEPHONE (OUTPATIENT)
Dept: FAMILY MEDICINE CLINIC | Age: 31
End: 2021-08-20

## 2021-08-20 DIAGNOSIS — R93.0 ABNORMAL HEAD CT: ICD-10-CM

## 2021-08-20 DIAGNOSIS — G43.909 MIGRAINE WITHOUT STATUS MIGRAINOSUS, NOT INTRACTABLE, UNSPECIFIED MIGRAINE TYPE: Primary | ICD-10-CM

## 2021-08-23 ENCOUNTER — TELEPHONE (OUTPATIENT)
Dept: FAMILY MEDICINE CLINIC | Age: 31
End: 2021-08-23

## 2021-08-23 NOTE — TELEPHONE ENCOUNTER
Nonda Goodell called in and is asking about some paperwork that was dropped off to the office on Wednesday last week. He is asking because he can not go back to work until it is complete.

## 2021-08-25 ENCOUNTER — HOSPITAL ENCOUNTER (OUTPATIENT)
Dept: MRI IMAGING | Age: 31
Discharge: HOME | End: 2021-08-27
Payer: COMMERCIAL

## 2021-08-25 DIAGNOSIS — G43.909 MIGRAINE WITHOUT STATUS MIGRAINOSUS, NOT INTRACTABLE, UNSPECIFIED MIGRAINE TYPE: ICD-10-CM

## 2021-08-25 PROCEDURE — 6360000004 HC RX CONTRAST MEDICATION: Performed by: RADIOLOGY

## 2021-08-25 PROCEDURE — A9577 INJ MULTIHANCE: HCPCS | Performed by: RADIOLOGY

## 2021-08-25 PROCEDURE — 70553 MRI BRAIN STEM W/O & W/DYE: CPT

## 2021-08-25 PROCEDURE — 70544 MR ANGIOGRAPHY HEAD W/O DYE: CPT

## 2021-08-25 RX ADMIN — GADOBENATE DIMEGLUMINE 10 ML: 529 INJECTION, SOLUTION INTRAVENOUS at 15:00

## 2021-09-17 ENCOUNTER — OFFICE VISIT (OUTPATIENT)
Dept: FAMILY MEDICINE CLINIC | Age: 31
End: 2021-09-17
Payer: COMMERCIAL

## 2021-09-17 VITALS
RESPIRATION RATE: 18 BRPM | OXYGEN SATURATION: 98 % | DIASTOLIC BLOOD PRESSURE: 69 MMHG | SYSTOLIC BLOOD PRESSURE: 112 MMHG | HEART RATE: 68 BPM | HEIGHT: 70 IN | WEIGHT: 125 LBS | BODY MASS INDEX: 17.9 KG/M2 | TEMPERATURE: 99.1 F

## 2021-09-17 DIAGNOSIS — G43.909 MIGRAINE WITHOUT STATUS MIGRAINOSUS, NOT INTRACTABLE, UNSPECIFIED MIGRAINE TYPE: Primary | ICD-10-CM

## 2021-09-17 PROCEDURE — 99213 OFFICE O/P EST LOW 20 MIN: CPT | Performed by: FAMILY MEDICINE

## 2021-09-17 RX ORDER — AMITRIPTYLINE HYDROCHLORIDE 10 MG/1
10 TABLET, FILM COATED ORAL NIGHTLY
Qty: 90 TABLET | Refills: 1 | Status: SHIPPED
Start: 2021-09-17 | End: 2021-10-28 | Stop reason: SDUPTHER

## 2021-09-17 ASSESSMENT — ENCOUNTER SYMPTOMS
COUGH: 0
CONSTIPATION: 0
ABDOMINAL PAIN: 0
PHOTOPHOBIA: 1
NAUSEA: 0
SHORTNESS OF BREATH: 0
VOMITING: 0
DIARRHEA: 0
WHEEZING: 0

## 2021-09-17 NOTE — PROGRESS NOTES
[facility]  FAMILY MEDICINE RESIDENCY PROGRAM  DATE OF VISIT : 2021    Patient : Luann Tubbs   Age : 35 y.o.  : 1990   MRN : <[id number]>   ______________________________________________________________________    Chief Complaint :   Chief Complaint   Patient presents with    Results       HPI : Luann Tubbs is 35 y.o. male who presented to the clinic today for migraine headaches. Migraine headaches: improving since last visit. Now less frequent and less severe. Intermittent has a bad episode but is less severe than a few months back. Last seen here appeared to be in status migrainosus, consulted with neuro due to neurological findings on exam. MRI was ordered, it was all normal. He was also given toradol with some relief. I reviewed the patient's past medications, allergies and past medical history during this visit. Past Medical History :        Past Medical History:   Diagnosis Date    Celiac disease     Hypoglycemia     Migraine headache with aura      No past surgical history on file.     Social History :  Social History     Tobacco History     Smoking Status  Former Smoker Quit date  2021 Smoking Frequency  0.5 packs/day Smoking Tobacco Type  Cigarettes    Smokeless Tobacco Use  Never Used          Alcohol History     Alcohol Use Status  No          Drug Use     Drug Use Status  Not Currently Types  Marijuana          Sexual Activity     Sexually Active  Yes Partners  Female                 Allergies :   No Known Allergies    Medication List :    Current Outpatient Medications   Medication Sig Dispense Refill    amitriptyline (ELAVIL) 10 MG tablet Take 1 tablet by mouth nightly 90 tablet 1    prochlorperazine (COMPAZINE) 10 MG tablet Take 1 tablet by mouth every 6 hours as needed (nausea and vomiting) 28 tablet 0    acetaminophen (TYLENOL) 500 MG tablet Take 1,000 mg by mouth every 6 hours as needed for Pain       No current facility-administered medications for this visit. Review of Systems :  Review of Systems   Constitutional: Negative for chills, fatigue and fever. Eyes: Positive for photophobia. Negative for visual disturbance. Respiratory: Negative for cough, shortness of breath and wheezing. Cardiovascular: Negative for chest pain, palpitations and leg swelling. Gastrointestinal: Negative for abdominal pain, constipation, diarrhea, nausea and vomiting. Neurological: Positive for headaches. Negative for dizziness, seizures, syncope, weakness, light-headedness and numbness. ______________________________________________________________________    Physical Exam :    Vitals: /69   Pulse 68   Temp 99.1 °F (37.3 °C) (Temporal)   Resp 18   Ht 5' 10\" (1.778 m)   Wt 125 lb (56.7 kg)   SpO2 98%   BMI 17.94 kg/m²   Physical Exam  Vitals reviewed. Constitutional:       General: He is not in acute distress. Appearance: Normal appearance. Cardiovascular:      Rate and Rhythm: Normal rate and regular rhythm. Pulses: Normal pulses. Heart sounds: Normal heart sounds. No murmur heard. Pulmonary:      Effort: Pulmonary effort is normal. No respiratory distress. Breath sounds: Normal breath sounds. No wheezing. Abdominal:      General: Bowel sounds are normal. There is no distension. Palpations: Abdomen is soft. Tenderness: There is no abdominal tenderness. Musculoskeletal:      Right lower leg: No edema. Left lower leg: No edema. Neurological:      General: No focal deficit present. Mental Status: He is alert and oriented to person, place, and time. Mental status is at baseline. Cranial Nerves: No cranial nerve deficit. Sensory: No sensory deficit. Motor: No weakness. ___________________    Assessment & Plan :    1.  Migraine without status migrainosus, not intractable, unspecified migraine type  -Patient is to keep a symptom diary to assess for symptom triggers  - amitriptyline (ELAVIL) 10 MG tablet; Take 1 tablet by mouth nightly  Dispense: 90 tablet; Refill: 1        Additional plan and future considerations:   RTO in 3 months for migraine follow-up    Return to Office: No follow-ups on file.     Joanne Gamino MD   Case discussed with Dr. Mine Laureano

## 2021-09-17 NOTE — PROGRESS NOTES
35 y.o. male who presented to the clinic today for migraine headaches.      Migraine headaches: improving since last visit. Now less frequent and less severe. Intermittent has a bad episode but is less severe than a few months back. Last seen here appeared to be in status migrainosus, consulted with neuro due to neurological findings on exam. MRI was ordered, it was all normal. He was also given toradol with some relief. Blood pressure 112/69, pulse 68, temperature 99.1 °F (37.3 °C), temperature source Temporal, resp. rate 18, height 5' 10\" (1.778 m), weight 125 lb (56.7 kg), SpO2 98 %. HEENT WNL     Heart regular    Lungs clear    abd non-tender      No edema    Pulses intact   Neurologically intact today. A&P:  Migraines: Start amitriptyline 10 mg daily for migraine prophylaxis. RTO in 3 months for migraine follow-up. Attending Physician Statement  I have discussed the case, including pertinent history and exam findings with the resident. I agree with the documented assessment and plan.

## 2021-10-18 ENCOUNTER — TELEPHONE (OUTPATIENT)
Dept: FAMILY MEDICINE CLINIC | Age: 31
End: 2021-10-18

## 2021-10-18 NOTE — TELEPHONE ENCOUNTER
The patient is requesting a letter stating he is able to return to work with no restrictions. He states his work had placed him on modification of not climbing ladders since he was in office in September.   He now wants to resume all activity with no restrictions    Please advise  If he needs seen by a  Physician, let me know   Thanks

## 2021-10-18 NOTE — LETTER
[facility]  [address]  Phone: [phone]  Fax: [phone]    Victor Hugo Leyva MD        October 21, 2021     Patient: Aissatou Burnette   YOB: 1990   Date of Visit: 10/18/2021       To Whom It May Concern: It is my medical opinion that Jakob Finn may return to full duty immediately with no restrictions. If you have any questions or concerns, please don't hesitate to call.     Sincerely,        Victor Hugo Leyva MD

## 2021-10-21 NOTE — TELEPHONE ENCOUNTER
Letter on file. Patient may .     Electronically signed by Jana Osorio MD on 10/21/21 at 9:23 AM EDT

## 2021-10-28 ENCOUNTER — OFFICE VISIT (OUTPATIENT)
Dept: NEUROLOGY | Age: 31
End: 2021-10-28
Payer: COMMERCIAL

## 2021-10-28 VITALS
HEIGHT: 70 IN | WEIGHT: 125 LBS | BODY MASS INDEX: 17.9 KG/M2 | SYSTOLIC BLOOD PRESSURE: 117 MMHG | HEART RATE: 76 BPM | DIASTOLIC BLOOD PRESSURE: 69 MMHG | OXYGEN SATURATION: 98 % | TEMPERATURE: 98.1 F

## 2021-10-28 DIAGNOSIS — G43.709 CHRONIC MIGRAINE WITHOUT AURA WITHOUT STATUS MIGRAINOSUS, NOT INTRACTABLE: ICD-10-CM

## 2021-10-28 PROCEDURE — 99204 OFFICE O/P NEW MOD 45 MIN: CPT | Performed by: NURSE PRACTITIONER

## 2021-10-28 RX ORDER — RIZATRIPTAN BENZOATE 10 MG/1
10 TABLET, ORALLY DISINTEGRATING ORAL DAILY PRN
Qty: 20 TABLET | Refills: 3 | Status: SHIPPED
Start: 2021-10-28 | End: 2021-11-05

## 2021-10-28 RX ORDER — AMITRIPTYLINE HYDROCHLORIDE 10 MG/1
20 TABLET, FILM COATED ORAL NIGHTLY
Qty: 180 TABLET | Refills: 3 | Status: SHIPPED
Start: 2021-10-28 | End: 2022-01-07

## 2021-10-28 NOTE — PATIENT INSTRUCTIONS
Patient Education        Stop all over-the-counter pain medications including acetaminophen (Tylenol), ibuprofen (Advil, Motrin), naproxen (aleve), and aspirin/acetaminophen/caffeine (Excedrin) until directed by your neurology provider              rizatriptan  Pronunciation:  Robertserafin PORRAS tan  Brand:  Maxalt, Maxalt-MLT  What is the most important information I should know about rizatriptan? You should not use this medicine if you have uncontrolled high blood pressure, heart problems, a history of heart attack or stroke, or circulation problems that cause a lack of blood supply within the body. Do not take rizatriptan within 24 hours before or after using another migraine headache medicine. Do not use this medicine if you have used an MAO inhibitor in the past 14 days, such as isocarboxazid, linezolid, methylene blue injection, phenelzine, rasagiline, selegiline, or tranylcypromine. What is rizatriptan? Rizatriptan is a headache medicine that narrows the blood vessels around the brain. Rizatriptan also reduces substances in the body that can trigger headache pain, nausea, sensitivity to light and sound, and other migraine symptoms. Rizatriptan is used to treat migraine headaches. Rizatriptan will only treat  a headache that has already begun. It will not prevent headaches or reduce the number of attacks. Rizatriptan should not be used to treat a common tension headache, or a headache that causes loss of movement on one side of your body. Use this medicine only if your condition has been confirmed by a doctor as migraine headaches. Rizatriptan may also be used for purposes not listed in this medication guide. What should I discuss with my healthcare provider before using rizatriptan?   You should not use rizatriptan if you are allergic to it, or if you have:  · heart problems, or a stroke (including \"mini-stroke\");  · coronary artery disease, angina (chest pain), blood circulation problems, lack of blood supply to the heart;  · circulation problems affecting your legs, arms, stomach, intestines, or kidneys;  · uncontrolled high blood pressure;  · severe liver disease; or  · a headache that seems different from your usual migraine headaches. Do not use rizatriptan if you have used an MAO inhibitor in the past 14 days. A dangerous drug interaction could occur. MAO inhibitors include isocarboxazid, linezolid, methylene blue injection, phenelzine, rasagiline, selegiline, tranylcypromine, and others. Tell your doctor if you have ever had:  · liver or kidney disease;  · heart problems, chest pain;  · shortness of breath; or  · risk factors for coronary artery disease (such as high blood pressure, high cholesterol, diabetes, menopause, smoking, a family history of coronary artery disease, being overweight, or being older than 36 and a man). Be sure your doctor knows if you also take stimulant medicine, opioid medicine, herbal products, or medicine for depression, mental illness, Parkinson's disease, migraine headaches, serious infections, or prevention of nausea and vomiting. These medicines may interact with rizatriptan and cause a serious condition called serotonin syndrome. Rizatriptan disintegrating tablets may contain phenylalanine. Tell your doctor if you have phenylketonuria (PKU). Tell your doctor if you are pregnant or breastfeeding. Rizatriptan is not approved for use by anyone younger than 10years old. How should I use rizatriptan? Take rizatriptan as soon as you notice headache symptoms. Follow all directions on your prescription label and read all medication guides or instruction sheets. Use the medicine exactly as directed. You may receive your first dose in a hospital or clinic setting to quickly treat any serious side effects. Read and carefully follow any Instructions for Use provided with your medicine. Ask your doctor or pharmacist if you do not understand these instructions.   Take the regular ergonovine, methylergonovine. Avoid driving or hazardous activity until you know how this medicine will affect you. Your reactions could be impaired. What are the possible side effects of rizatriptan? Get emergency medical help if you have signs of an allergic reaction: hives; difficult breathing; swelling of your face, lips, tongue, or throat. Stop using rizatriptan and call your doctor at once if you have:  · sudden and severe stomach pain and bloody diarrhea;  · cold feeling or numbness in your feet and hands;  · severe headache, blurred vision, pounding in your neck or ears;  · heart attack symptoms --chest pain or pressure, pain spreading to your jaw or shoulder, nausea, sweating;  · high levels of serotonin in the body --agitation, hallucinations, fever, sweating, shivering, fast heart rate, muscle stiffness, twitching, loss of coordination, nausea, vomiting, diarrhea; or  · signs of a stroke --sudden numbness or weakness (especially on one side of the body), sudden severe headache, slurred speech, problems with vision or balance. Common side effects may include:  · dizziness, drowsiness, feeling tired; or  · pain or a feeling of pressure in your throat or chest.  This is not a complete list of side effects and others may occur. Call your doctor for medical advice about side effects. You may report side effects to FDA at 3-389-FDA-6360. What other drugs will affect rizatriptan? Tell your doctor about all your other medicines, especially:  · propranolol;  · an antidepressant; or  · medicine to treat a mood disorder. This list is not complete. Other drugs may affect rizatriptan, including prescription and over-the-counter medicines, vitamins, and herbal products. Not all possible drug interactions are listed here. Tell your doctor about all your other medicines, especially any type of antidepressant.    Other drugs may affect rizatriptan, including prescription and over-the-counter medicines, vitamins, and herbal products. Tell your doctor about all your current medicines and any medicine you start or stop using. Where can I get more information? Your pharmacist can provide more information about rizatriptan. Remember, keep this and all other medicines out of the reach of children, never share your medicines with others, and use this medication only for the indication prescribed. Every effort has been made to ensure that the information provided by [address] [place] [address]  is accurate, up-to-date, and complete, but no guarantee is made to that effect. Drug information contained herein may be time sensitive. [facility] information has been compiled for use by healthcare practitioners and consumers in the [place] and therefore [facility] does not warrant that uses outside of the [place] are appropriate, unless specifically indicated otherwise. [facility]'s drug information does not endorse drugs, diagnose patients or recommend therapy. [facility]'s drug information is an informational resource designed to assist licensed healthcare practitioners in caring for their patients and/or to serve consumers viewing this service as a supplement to, and not a substitute for, the expertise, skill, knowledge and judgment of healthcare practitioners. The absence of a warning for a given drug or drug combination in no way should be construed to indicate that the drug or drug combination is safe, effective or appropriate for any given patient. [facility] does not assume any responsibility for any aspect of healthcare administered with the aid of information [facility] provides. The information contained herein is not intended to cover all possible uses, directions, precautions, warnings, drug interactions, allergic reactions, or adverse effects. If you have questions about the drugs you are taking, check with your doctor, nurse or pharmacist.  Copyright 7526-6234 [employer] [address] [place] [address] Avenue: 12.01. Revision date: 10/9/2019.   Care instructions adapted under license by [facility] ([facility]). If you have questions about a medical condition or this instruction, always ask your healthcare professional. [address] any warranty or liability for your use of this information. Patient Education         Rebound Headaches (01:22)  Your health professional recommends that you watch this short online health video. Learn why taking headache pain medicine too often may be causing even more headaches. Purpose:  Discusses rebound headaches and how they create a cycle of medication overuse. Covers changing or stopping medication. Goal:  The user will learn that taking headache pain medicine too often may cause even more headaches. How to watch the video    Scan the QR code   OR Visit the website    [email or web address]   Current as of: April 8, 2021               Content Version: 13.0  © 2006-2021 [employer]. Care instructions adapted under license by [facility] ([facility]). If you have questions about a medical condition or this instruction, always ask your healthcare professional. [email or web address] any warranty or liability for your use of this information.

## 2021-10-28 NOTE — PROGRESS NOTES
[facility]. Gsutavo Paredes M.D., F.A.C.P. Anneliese Marc, SHAYNA, APRN, CNS  Opal Grajeda. Jose Juan Rocha, MSN, APRN-FNP-C  Lanie Tejeda MSN, APRN, FNP-C  Cyrus LOMELI PA-C  [address] MSN, APRN, FNP-C  [address], [address] [address]  Phone: [phone]  Fax: [phone]       Aissatou Burnette is a 35 y.o. right handed male   Past Medical History:     Past Medical History:   Diagnosis Date    Celiac disease     Hypoglycemia     Kidney stones     Migraine headache with aura      Past Surgical History:       No past surgical history on file. Allergies:       Patient has no known allergies. Medications:     Prior to Admission medications    Medication Sig Start Date End Date Taking?  Authorizing Provider   amitriptyline (ELAVIL) 10 MG tablet Take 1 tablet by mouth nightly 9/17/21   Victor Hugo Leyva MD   prochlorperazine (COMPAZINE) 10 MG tablet Take 1 tablet by mouth every 6 hours as needed (nausea and vomiting) 8/16/21 9/17/21  Victor Hugo Leyva MD   acetaminophen (TYLENOL) 500 MG tablet Take 1,000 mg by mouth every 6 hours as needed for Pain    Historical Provider, MD     Social History:       He is not  but has a girlfriend  He has no children  He works at the [employer]    He has decreased his smoking and smokes cigarettes about once per month since age 16; he stopped vaping couple years ago; he smokes marijuana off and on does not use alcohol    Review of Systems:     No chest pain or palpitations  No SOB  No vertigo, lightheadedness or loss of consciousness  No falls, tripping or stumbling  No incontinence of bowels or bladder  No itching or bruising appreciated  No numbness, tingling or focal arm/leg weakness    ROS is otherwise negative    Family History:     Family History   Problem Relation Age of Onset    Diabetes Mother     Migraines Mother     Atrial Fibrillation Sister      History of Present Illness: [facility] Adult  Hospitalist Group                                                                                          Hospitalist Progress Note  Brian Ayala MD  Answering service: [phone] from in house phone        Date of Service:  7/15/2021  NAME:  Norma Welsh  :  1949  MRN:  [id number]    This documentation was facilitated by a Voice Recognition software and may contain inadvertent typographical errors. Admission Summary:   Patient presented to the ED on  with shortness of breath and fever and she was found to have Covid pneumonia. She is not vaccinated    Interval history / Subjective:        Patient's seen and examined. She is alert or x3. On oxygen via high flow nasal cannula. Apparently the tubing was clogged and she was not getting oxygen however her SPO2 was upper 80slow 90s. I have manipulated so now she is getting oxygen, discussed with RN to call respiratory therapy take a look at it. Patient had coughing spell during my exam, she keeps taking the nasal cannula of her nostrils. I have advised her to keep the nasal cannula on all the time. Assessment & Plan:   Acute hypoxic respiratory failure  D/t SARS-CoV-2 infection   CTA show:  Subpleural interstitial thickening throughout both lung fields may  be related to atypical viral pneumonia. No evidence of pulmonary embolism. C/w Remdesivir, dexamethasone  Acterma x 1 dose given today  C/w emperic Abx  Follow inflammatory markers  DVT prophylaxis per Covid protocol  On oxygen via high flow nasal cannula  Pulmonary following. DM with hyperglycemia precipitated by steroid and acute illness  She mentions not taking any meds currently  Increase Lantus to 20 units nightly. Humalog sliding scale coverage. Elevated creatinine:  --Unknown baseline. No lab today as she was hard stick.      Uncontrolled HTN:  C/w Metoprolol, amlodipine  Hydralazine prn    Access: PICC line placed on 7/15 DVT ppx: Lovenox per Covid protocol  CODE STATUS: She wishes to be full code  Isolation: Droplet plus     Morbid obesity  Estimated discharge date: Greater than 72 hours      Hospital Problems  Never Reviewed        Codes Class Noted POA    COVID-19 ICD-10-CM: U07.1  ICD-9-CM: 079.89  7/13/2021 Unknown                Review of Systems:   A comprehensive review of systems was negative except for that written in the HPI. Vital Signs:    Last 24hrs VS reviewed since prior progress note. Most recent are:  Visit Vitals  BP (!) 159/61 (BP 1 Location: Right upper arm, BP Patient Position: At rest)   Pulse 90   Temp 97.9 °F (36.6 °C)   Resp 22   Ht 5' 5\" (1.651 m)   Wt 92.1 kg (203 lb)   SpO2 93%   BMI 33.78 kg/m²       No intake or output data in the 24 hours ending 07/15/21 1843     Physical Examination:     I had a face to face encounter with this patient and independently examined them on7/15/2021 as outlined below:        Constitutional:  Alert oriented, not in distress    HEENT:  Atraumatic. Oral mucosa moist,. Non icteric sclera. No pallor. Resp:  On oxygen via high flow nasal cannula: Symmetrical air entry. Chest Wall: No deformity   CV:  Regular rhythm, normal rate, no murmurs, gallops, rubs    GI:  Soft, non distended, non tender.  normoactive bowel sounds, no hepatosplenomegaly    :  No CVA or suprapubic tenderness    Musculoskeletal:  No edema, warm, 2+ pulses throughout    Neurologic:  Mental status:AAOx3,   Cranial nerves II-XII : WNL  Motor exam:Moves all extremities symmetrically              Data Review:    Review and/or order of clinical lab test  Review and/or order of tests in the radiology section of CPT  Review and/or order of tests in the medicine section of CPT      Labs:     Recent Labs     07/15/21  1635 07/13/21  1031   WBC 6.1 5.2   HGB 11.9 12.0   HCT 36.1 36.6    256     Recent Labs     07/13/21  1031   *   K 4.1      CO2 23   BUN 16   CREA 1.26*   *   CA 9.1 The patient was referred by Dr. Radha Proctor for migraines    He presents with his girlfriend and is an excellent historian with significant past medical history of migraines, kidney stones, celiac disease, and hypoglycemia    Description of Headaches:  Location of pain: either temple; R back of the head  Radiation of pain?:retroorbital  Character of pain:pulsating, sharp and stabbing  Severity of pain: 7-10  Accompanying symptoms: nausea, vomiting, sonophobia, photophobia, paresthesias limb numbness, mental status changes word finding trouble, vertigo, blurring L eye  Aura: none  Prodromal sx?: none  Rapidity of onset: gradual; sometimes sudden  Typical duration of individual headache: 2 days  Are most headaches similar in presentation? yes  Aggravating factors: physical activity  Typical precipitants: tobacco, stress    Temporal Pattern of Headaches:  Started having HA's at age 7  Worst time of day: varies  Awaken from sleep?: yes --sometimes  Seasonal pattern?: no  Clustering of HA's over time? no  Overall pattern since problem began: gradually worsening    Degree of Functional Impairment: moderate and severe    Current Use of Meds to Treat HA:  Abortive meds? acetaminophen, aspirin/acetaminophen/caffeine, compazine  Daily use? yes - most days of the week  Prophylactic meds? antidepressants (amtriptyline)    Additional Relevant History:  History of head/neck trauma? no  History of head/neck surgery? no  Family h/o headache problems? yes - mother had migraines  Use of meds that might worsen HA's (nitrates, exogenous estrogens,    Nifedipine)? no  Exposure to carbon monoxide? no  Substance use: tobacco:    A month ago his PCP put him on low-dose amitriptyline for his migraines which initially had some benefit, however for the past 2 weeks his migraines have increased and he is having about 3 to 5/week--which can occasionally last up to 2 days.   He also feels a bit more irritable on the amitriptyline than Recent Labs     07/13/21  1031   ALT 16   AP 66   TBILI 0.6   TP 8.4*   ALB 3.4*   GLOB 5.0*     No results for input(s): INR, PTP, APTT, INREXT in the last 72 hours. No results for input(s): FE, TIBC, PSAT, FERR in the last 72 hours.    No results found for: FOL, RBCF   Recent Labs     07/13/21  2132   PH 7.41   PCO2 31*   PO2 64*     Recent Labs     07/13/21  1542   TROIQ <0.05     No results found for: CHOL, CHOLX, CHLST, CHOLV, HDL, HDLP, LDL, LDLC, DLDLP, TGLX, TRIGL, TRIGP, CHHD, CHHDX  Lab Results   Component Value Date/Time    Glucose (POC) 319 (H) 07/15/2021 06:09 PM    Glucose (POC) 323 (H) 07/15/2021 11:38 AM    Glucose (POC) 342 (H) 07/15/2021 08:00 AM    Glucose (POC) 288 (H) 07/14/2021 10:46 PM    Glucose (POC) 338 (H) 07/14/2021 04:58 PM     No results found for: COLOR, APPRN, SPGRU, REFSG, KEE, PROTU, GLUCU, KETU, BILU, UROU, GAUTAM, LEUKU, GLUKE, EPSU, BACTU, WBCU, RBCU, CASTS, UCRY      Medications Reviewed:     Current Facility-Administered Medications   Medication Dose Route Frequency    enoxaparin (LOVENOX) injection 40 mg  40 mg SubCUTAneous Q12H    insulin lispro (HUMALOG)   SubCUTAneous AC&HS    glucagon (GLUCAGEN) injection 1 mg  1 mg IntraMUSCular PRN    dextrose (D50W) injection syrg 12.5 g  25 mL IntraVENous PRN    glucose chewable tablet 12 g  3 Tablet Oral PRN    amLODIPine (NORVASC) tablet 10 mg  10 mg Oral DAILY    metoprolol tartrate (LOPRESSOR) tablet 50 mg  50 mg Oral Q12H    insulin glargine (LANTUS) injection 15 Units  15 Units SubCUTAneous QHS    dexamethasone (PF) (DECADRON) 10 mg/mL injection 6 mg  6 mg IntraVENous Q24H    sodium chloride (NS) flush 5-40 mL  5-40 mL IntraVENous Q8H    sodium chloride (NS) flush 5-40 mL  5-40 mL IntraVENous PRN    0.9% sodium chloride infusion  75 mL/hr IntraVENous CONTINUOUS    cefTRIAXone (ROCEPHIN) 1 g in 0.9% sodium chloride (MBP/ADV) 50 mL MBP  1 g IntraVENous Q24H    azithromycin (ZITHROMAX) 500 mg in 0.9% sodium chloride before. Over the past 6 months he has also had a couple spells of random, sharp, immediate head pains transitioning into nausea, dizziness, hand numbness, difficulty walking and seeing followed by his typical migraine--he has gone to the ER for this before and had a negative workup. He had MRI of the brain and MRA head recentlywhich were all unremarkable. No positional or exertional triggers, autonomic signs and symptoms, vision loss or eye pain, or other red flag signs with his headaches. He is typically uses Excedrin Migraine multiple times per week. He has never tried any other preventive or rescue migraine medications. He has intermittent hypotension as well as hypoglycemia--- and beta-blockers are contraindicated. Unable to have topiramate due to history of kidney stones. He sleeps poorly, drinks adequate water, drinks minimal caffeine, eats regularly, and does not formally exercise but has a very physically demanding job. He has not had an eye exam in the past year. Objective:     /69 (Site: Right Upper Arm, Position: Sitting, Cuff Size: Medium Adult)   Pulse 76   Temp 98.1 °F (36.7 °C) (Infrared)   Ht 5' 10\" (1.778 m)   Wt 125 lb (56.7 kg)   SpO2 98%   BMI 17.94 kg/m²     General appearance: alert, appears stated age, cooperative and in no distress  Head: normocephalic, without obvious abnormality, atraumatic  Eyes: conjunctivae/corneas clear; no drainage--fundi not well visualized  Neck: full ROM without cervicalgia  Back: symmetric, no curvature.  ROM normal.   Lungs: clear to auscultation bilaterally  Heart: regular rate and rhythm---no murmur  Abdomen: soft, non-tender; bowel sounds normal; no masses,  no organomegaly  Extremities: normal, atraumatic, no cyanosis or edema  Pulses: 2+ and symmetric  Skin:  color, texture, turgor normal--no rashes or lesions      Mental Status: alert and oriented x 4    Appropriate attention/concentration  Intact fundus of knowledge  Memories 250 mL (VIAL-MATE)  500 mg IntraVENous Q24H    acetaminophen (TYLENOL) tablet 650 mg  650 mg Oral Q4H PRN    zinc sulfate (ZINCATE) 50 mg zinc (220 mg) capsule 1 Capsule  1 Capsule Oral DAILY    ascorbic acid (vitamin C) (VITAMIN C) tablet 500 mg  500 mg Oral BID    remdesivir 100 mg in 0.9% sodium chloride 250 mL IVPB  100 mg IntraVENous Q24H    aspirin chewable tablet 81 mg  81 mg Oral DAILY    hydrALAZINE (APRESOLINE) 20 mg/mL injection 10 mg  10 mg IntraVENous Q4H PRN     ______________________________________________________________________  EXPECTED LENGTH OF STAY: 5d 9h  ACTUAL LENGTH OF STAY:          2                 Nicky Jorge MD intact    Speech: no dysarthria  Language: no aphasias    Cranial Nerves:  I: smell    II: visual acuity     II: visual fields Full    II: pupils NIK   III,VII: ptosis None   III,IV,VI: extraocular muscles  EOMI without nystagmus   V: mastication Normal   V: facial light touch sensation  Normal   V,VII: corneal reflex     VII: facial muscle function - upper  Normal   VII: facial muscle function - lower Normal   VIII: hearing Normal   IX: soft palate elevation  Normal   IX,X: gag reflex    XI: trapezius strength  5/5   XI: sternocleidomastoid strength 5/5   XI: neck extension strength  5/5   XII: tongue strength  Normal     Motor:  5/5 throughout  Normal bulk and tone  No drift   No abnormal movements    Sensory:  LT  normal    Coordination:   FN, FFM and FLORIDALMA normal    Gait:  Normal    DTR:   2+ throughout    No Ortega's    No other pathological reflexes    Laboratory/Radiology:  ry/Radiology:     CBC with Differential:    Lab Results   Component Value Date    WBC 7.0 08/13/2021    RBC 5.45 08/13/2021    HGB 15.9 08/13/2021    HCT 47.3 08/13/2021     08/13/2021    MCV 86.8 08/13/2021    MCH 29.2 08/13/2021    MCHC 33.6 08/13/2021    RDW 13.1 08/13/2021    SEGSPCT 64 09/10/2012    LYMPHOPCT 46.3 08/13/2021    MONOPCT 7.6 08/13/2021    BASOPCT 0.7 08/13/2021    MONOSABS 0.53 08/13/2021    LYMPHSABS 3.23 08/13/2021    EOSABS 0.14 08/13/2021    BASOSABS 0.05 08/13/2021     CMP:    Lab Results   Component Value Date     08/13/2021    K 3.3 08/13/2021    CL 98 08/13/2021    CO2 23 08/13/2021    BUN 17 08/13/2021    CREATININE 0.9 08/13/2021    GFRAA >60 08/13/2021    LABGLOM >60 08/13/2021    GLUCOSE 103 08/13/2021    CALCIUM 9.5 08/13/2021     MRI brain and MRA head August 2021: unremarkable    All labs and images were personally reviewed at the time of this visit    Assessment:     Chronic migraines without aura: with over 15-20 HA days per month on low dose amitriptyline.   As this medication initially provided him some benefit, will attempt to maximize dosage before switching or adding on adjunctive preventative. It is unclear if amitriptyline is contributing to his mood changes or his chronic sleep deprivation or migraine frequency in general.  There is limited ability to add beta-blockers or topiramate as explained above. We will first try triptan for headache rescue. Exam is normal    Possible medication overuse headache: Excessive use of Excedrin Migraine could be contributing to overall headache frequency. Plan:     Increase amitriptyline to 20 mg nightly    Maxalt-MLT 10 mg PRN    14-day washout from all OTC analgesia then use these agents no more than 10 times per month    Sleep hygiene    Careful headache diary    Return to office in 6 to 8 weeks or sooner as needed    Sumaya Odom, MISAEL - CNP  7:59 AM  10/28/2021    I spent 43 minutes with this patient obtaining the HPI and discussing the exam with greater than 50% of the time providing counseling and education on medications and other treatment plans. All questions were answered prior to leaving my office.

## 2021-11-04 ENCOUNTER — PATIENT MESSAGE (OUTPATIENT)
Dept: NEUROLOGY | Age: 31
End: 2021-11-04

## 2021-11-05 RX ORDER — SUMATRIPTAN 100 MG/1
100 TABLET, FILM COATED ORAL DAILY PRN
Qty: 9 TABLET | Refills: 3 | Status: SHIPPED
Start: 2021-11-05 | End: 2022-01-07

## 2021-11-05 NOTE — TELEPHONE ENCOUNTER
From: Kassie Rivera  To: Laynejesús Trinidad, MISAEL - RAJEEV  Sent: 11/4/2021 5:17 PM EDT  Subject: Non-Urgent Medical Question    Dr. Jose Johnson,    I was just messaging you to ask a few questions since my last appointment. My insurance is giving me trouble getting the new amitriptyline dose so I've just been doubling up on the 10mg pills I have now. I have not taken any Tylenol since you've advised me to detox from it and the rizatriptan has not been helping much. I've had several migraines this week which today I had to call off work for. I seen the eye doctor and he wants me to keep up with appointments with him because he had concerns of possible eye diseases. Was just curious what you think about everything.     Ximena Mccallum

## 2021-11-21 ENCOUNTER — PATIENT MESSAGE (OUTPATIENT)
Dept: NEUROLOGY | Age: 31
End: 2021-11-21

## 2021-11-22 NOTE — TELEPHONE ENCOUNTER
From: Christian Peters  To: Aleksandraned All  Sent: 11/21/2021 3:46 PM EST  Subject: Medications    Dr. Gayla Parisi,    I just wanted to message you and tell you the emergency tablets have been helping a lot but the amitriptyline HCL has not been working. I'm back to getting migraines regularly and the medication makes me very irritable and groggy still. I know you told me to keep you updated on that. Due to the medication no longer helping I've been going through the emergency tablets very quickly. I've just been trying to sleep through or deal with the migraines but it has been disrupting a lot of my daily activities.      Rigoberto Nicholson

## 2021-11-23 ENCOUNTER — TELEPHONE (OUTPATIENT)
Dept: NEUROLOGY | Age: 31
End: 2021-11-23

## 2021-11-23 NOTE — TELEPHONE ENCOUNTER
Patti Approval of Nurtec - [id number], valid 11/23/2021 - 11/23/2022  Electronically signed by Fidencio Jarrett on 11/23/21 at 2:34 PM EST

## 2022-01-06 NOTE — PROGRESS NOTES
[address] [facility]. Raphael Espinoza M.D., F.A.C.P. Jade Benjamin, SHAYNA, APRN, CNS  Maribel Martinez. Joo Chowdhury, MSN, APRN-FNP-C  Sarai Reyna MSN, APRN, FNP-C  Michele LOMELI, PA-MARK  [address] MSN, APRN, FNP-C  [address], [address] [place], [address] [address]  Phone: [phone]  Fax: [phone]           Cassidy Rodriguez is a 28 y.o. right handed male     We are following him for migraines    He presents with his girlfriend and is an excellent historian     Unfortunately, rizatriptan was ineffective for rescue, and increasing doses of amitriptyline showed no reduction in his migraines. He was started on preventive dosing of Nurtec, and has noticed a 50% reduction in his headache frequency, now having 1 to 2/week. When he does get a headache he will sometimes use over-the-counter analgesia which does not always work. He has stopped overusing analgesia as instructed. Other medications failed or contraindicated: Beta-blockers due to hypotension and hypoglycemia; topiramate due to kidney stones; failed sumatriptan    He is following with ophthalmology for early degenerative changes. He states there was no mention of papilledema. He denies any progressive blurring of vision.   No other complaints today    No chest pain or palpitations  No SOB  No vertigo, lightheadedness or loss of consciousness  No falls, tripping or stumbling  No incontinence of bowels or bladder  No itching or bruising appreciated  No numbness, tingling or focal arm/leg weakness  No speech or swallowing troubles    ROS otherwise negative     Medications:     Current Outpatient Medications   Medication Sig Dispense Refill    Rimegepant Sulfate 75 MG TBDP Take 75 mg by mouth every other day No more than 1 tab in 24 hours 16 tablet 3    prochlorperazine (COMPAZINE) 10 MG tablet Take 1 tablet by mouth every 6 hours as needed (nausea and vomiting) 28 tablet 0     No current facility-administered medications abortive therapy for breakthrough headaches when Nurtec does not work.  His exam is normal     Plan:     Start Ubrelvy 100 mg as needed--- samples given    Continue Nurtec every other day    Careful headache diary    Return to office in 6 months or sooner as needed    MISAEL Meier - CNP, [facility]  1:49 PM  1/6/2022

## 2022-01-07 ENCOUNTER — OFFICE VISIT (OUTPATIENT)
Dept: NEUROLOGY | Age: 32
End: 2022-01-07
Payer: COMMERCIAL

## 2022-01-07 VITALS
TEMPERATURE: 97.2 F | HEIGHT: 69 IN | BODY MASS INDEX: 18.51 KG/M2 | WEIGHT: 125 LBS | SYSTOLIC BLOOD PRESSURE: 111 MMHG | DIASTOLIC BLOOD PRESSURE: 72 MMHG | OXYGEN SATURATION: 98 % | HEART RATE: 70 BPM

## 2022-01-07 DIAGNOSIS — G43.009 MIGRAINE WITHOUT AURA AND WITHOUT STATUS MIGRAINOSUS, NOT INTRACTABLE: Primary | ICD-10-CM

## 2022-01-07 PROBLEM — G43.709 CHRONIC MIGRAINE WITHOUT AURA WITHOUT STATUS MIGRAINOSUS, NOT INTRACTABLE: Status: RESOLVED | Noted: 2021-10-28 | Resolved: 2022-01-07

## 2022-01-07 PROCEDURE — 99214 OFFICE O/P EST MOD 30 MIN: CPT | Performed by: NURSE PRACTITIONER

## 2022-01-07 RX ORDER — UBROGEPANT 100 MG/1
100 TABLET ORAL DAILY PRN
Qty: 5 TABLET | Refills: 0 | COMMUNITY
Start: 2022-01-07 | End: 2022-05-20 | Stop reason: SDUPTHER

## 2022-01-07 RX ORDER — UBROGEPANT 100 MG/1
100 TABLET ORAL DAILY PRN
Qty: 48 TABLET | Refills: 3 | Status: SHIPPED
Start: 2022-01-07 | End: 2022-08-17 | Stop reason: SDUPTHER

## 2022-01-07 NOTE — PATIENT INSTRUCTIONS
Patient Education        ubrogepant  Pronunciation:  carolin SANTANA jaqueline britney  Brand:  Kierra Neff  What is the most important information I should know about ubrogepant? Tell your doctor about all your current medicines and any you start or stop using. Many drugs can interact, and some drugs should not be used together. What is ubrogepant? Arvella Yen is used in adults to treat migraine headaches with or without aura. Arvella Yen will not prevent a migraine headache. Arvella Yen may also be used for purposes not listed in this medication guide. What should I discuss with my healthcare provider before taking ubrogepant? Many drugs can interact and cause dangerous effects. Some drugs should not be used together with ubrogepant. Your doctor may change your treatment plan if you also use:  · nefazodone;  · an antibiotic --clarithromycin, telithromycin;  · antifungal medicine --itraconazole, ketoconazole; or  · antiviral medicine to treat HIV/AIDS --indinavir, nelfinavir, ritonavir, saquinavir. Tell your doctor if you have ever had:  · liver disease; or  · kidney disease. Tell your doctor if you are pregnant or plan to become pregnant. It is not known whether ubrogepant will harm an unborn baby. However, having migraine headaches during pregnancy may cause complications such as diabetes or eclampsia (dangerously high blood pressure that can lead to medical problems in both mother and baby). The benefit of treating migraines may outweigh any risks to the baby. It may not be safe to breastfeed while using this medicine. Ask your doctor about any risk. Arvella Yen is not approved for use by anyone younger than 25years old. How should I take ubrogepant? Follow all directions on your prescription label and read all medication guides or instruction sheets. Use the medicine exactly as directed. You may take ubrogepant with or without food. Avoid grapefruit or grapefruit juice.   After taking ubrogepant:  If your headache does not completely go away, or goes away and comes back, you may take a second tablet if it has been at least 2 hours since your first dose. If your symptoms have not improved, contact your doctor before taking any more tablets. You should not take a second tablet within 24 hours if you have consumed a grapefruit product, or if you also take any of the following medications:   · ciprofloxacin;  · cyclosporine;  · fluconazole;  · fluvoxamine; or  · verapamil. Call your doctor if you have more than 8 headaches in one month (30 days). Tell your doctor if this medicine seems to stop working as well in treating your migraine attacks. Store at room temperature away from moisture and heat. What happens if I miss a dose? Since ubrogepant is used when needed, it does not have a daily dosing schedule. Do not take more than 200 milligrams in a 24-hour period. Do not use ubrogepant to treat more than 8 headaches per month. What happens if I overdose? Seek emergency medical attention or call the Poison Help line at 1-771.453.8056. What should I avoid while taking ubrogepant? Grapefruit may interact with ubrogepant and lead to unwanted side effects. You should not take a second ubrogepant tablet within 24 hours after consuming grapefruit or grapefruit juice. What are the possible side effects of ubrogepant? Get emergency medical help if you have signs of an allergic reaction: hives; difficult breathing; swelling of your face, lips, tongue, or throat. Common side effects may include:  · nausea; or  · drowsiness. This is not a complete list of side effects and others may occur. Call your doctor for medical advice about side effects. You may report side effects to FDA at 5-925-PVG-8915. What other drugs will affect ubrogepant? Sometimes it is not safe to use certain medications at the same time.  Some drugs can affect your blood levels of other drugs you take, which may increase side effects or make the medications less effective. Tell your doctor about all your current medicines. Many drugs can affect ubrogepant, especially:  · curcumin (also called turmeric);  · cyclosporine;  · eltrombopag;  · fluconazole;  · fluvoxamine;  · phenytoin;  · Dulce's wort;  · an antibiotic --ciprofloxacin, rifampin;  · a barbiturate --butabarbital, phenobarbital, secobarbital; or  · heart or blood pressure medicine --carvedilol, quinidine, verapamil. This list is not complete and many other drugs may affect ubrogepant. This includes prescription and over-the-counter medicines, vitamins, and herbal products. Not all possible drug interactions are listed here. Where can I get more information? Your pharmacist can provide more information about ubrogepant. Remember, keep this and all other medicines out of the reach of children, never share your medicines with others, and use this medication only for the indication prescribed. Every effort has been made to ensure that the information provided by [address] is accurate, up-to-date, and complete, but no guarantee is made to that effect. Drug information contained herein may be time sensitive. [facility] information has been compiled for use by healthcare practitioners and consumers in the [place] and therefore [facility] does not warrant that uses outside of the [place] are appropriate, unless specifically indicated otherwise. [facility]'s drug information does not endorse drugs, diagnose patients or recommend therapy. [facility] drug information is an informational resource designed to assist licensed healthcare practitioners in caring for their patients and/or to serve consumers viewing this service as a supplement to, and not a substitute for, the expertise, skill, knowledge and judgment of healthcare practitioners.  The absence of a warning for a given drug or drug combination in no way should be construed to indicate that the drug or drug combination is safe, effective or appropriate for any given patient. [facility] does not assume any responsibility for any aspect of healthcare administered with the aid of information [facility] provides. The information contained herein is not intended to cover all possible uses, directions, precautions, warnings, drug interactions, allergic reactions, or adverse effects. If you have questions about the drugs you are taking, check with your doctor, nurse or pharmacist.  Copyright 7566-3065 [address]. Version: 1.01. Revision date: 2/18/2020. Care instructions adapted under license by [facility] ([place] [facility]). If you have questions about a medical condition or this instruction, always ask your healthcare professional. [place] [address] any warranty or liability for your use of this information. Patient Education        rimegepant  Pronunciation:  an tan  Brand:  Nurtec ODT  What is the most important information I should know about rimegepant? Follow all directions on your medicine label and package. Tell each of your healthcare providers about all your medical conditions, allergies, and all medicines you use. What is rimegepant? Rimegepant is used in adults to treat a migraine headache with or without aura. Rimegepant will not prevent  migraines. Rimegepant may also be used for purposes not listed in this medication guide. What should I discuss with my healthcare provider before taking rimegepant? You should not use rimegepant if you are allergic to it. Tell your doctor if you have ever had:  · liver disease; or  · kidney disease (or if you are on dialysis). It is not known whether rimegepant will harm an unborn baby. However, having migraine headaches may cause complications during pregnancy such as dangerously high blood pressure that can lead to medical problems in both mother and baby. The benefit of treating migraines may outweigh any risks to the baby. It may not be safe to breastfeed while using this medicine. Ask your doctor about any risk. Rimegepant is not approved for use by anyone younger than 25years old. How should I take rimegepant? Follow all directions on your prescription label and read all medication guides or instruction sheets. Use the medicine exactly as directed. Call your doctor if your symptoms do not improve, or if you have more than 15 headaches in one month (30 days). Remove an orally disintegrating tablet from the package only when you are ready to take the medicine. Place the tablet on or under your tongue and allow it to dissolve, without chewing. Swallow several times as the tablet dissolves. Store at cool room temperature, away from moisture and heat. What happens if I miss a dose? Since rimegepant is used when needed, it does not have a daily dosing schedule. Call your doctor if your symptoms do not improve after using this medicine. What happens if I overdose? Seek emergency medical attention or call the Poison Help line at 1-535.246.2693. What should I avoid while taking rimegepant? Follow your doctor's instructions about any restrictions on food, beverages, or activity. What are the possible side effects of rimegepant? Get emergency medical help if you have signs of an allergic reaction: hives, rash; difficult breathing; swelling of your face, lips, tongue, or throat. An allergic reaction can be severe, and may occur days after you have taken rimegepant. Common side effects may include nausea. This is not a complete list of side effects and others may occur. Call your doctor for medical advice about side effects. You may report side effects to FDA at 7-408-GCU-8600. What other drugs will affect rimegepant? Sometimes it is not safe to use certain medications at the same time. Some drugs can affect your blood levels of other drugs you take, which may increase side effects or make the medications less effective.   If you use any of the following medicines, avoid taking rimegepant within 48 hours after you last took the other medicine:  · aprepitant;  · erythromycin;  · fluconazole; or  · heart or blood pressure medicine --diltiazem, verapamil. This list is not complete and many other drugs may affect rimegepant. This includes prescription and over-the-counter medicines, vitamins, and herbal products. Not all possible drug interactions are listed here. Where can I get more information? Your pharmacist can provide more information about rimegepant. Remember, keep this and all other medicines out of the reach of children, never share your medicines with others, and use this medication only for the indication prescribed. Every effort has been made to ensure that the information provided by [address] [place] [address]  is accurate, up-to-date, and complete, but no guarantee is made to that effect. Drug information contained herein may be time sensitive. [facility] information has been compiled for use by healthcare practitioners and consumers in the [place] and therefore [facility] does not warrant that uses outside of the [place] are appropriate, unless specifically indicated otherwise. [facility]'s drug information does not endorse drugs, diagnose patients or recommend therapy. [facility] drug information is an informational resource designed to assist licensed healthcare practitioners in caring for their patients and/or to serve consumers viewing this service as a supplement to, and not a substitute for, the expertise, skill, knowledge and judgment of healthcare practitioners. The absence of a warning for a given drug or drug combination in no way should be construed to indicate that the drug or drug combination is safe, effective or appropriate for any given patient. [facility] does not assume any responsibility for any aspect of healthcare administered with the aid of information [facility] provides.  The information contained herein is not intended to cover all possible uses, directions, precautions, warnings, drug interactions, allergic reactions, or adverse effects. If you have questions about the drugs you are taking, check with your doctor, nurse or pharmacist.  Copyright 0740-9642 [address]. Version: 1.01. Revision date: 3/27/2020. Care instructions adapted under license by [facility] ([facility]). If you have questions about a medical condition or this instruction, always ask your healthcare professional. [place] [address] any warranty or liability for your use of this information.

## 2022-01-13 ENCOUNTER — TELEPHONE (OUTPATIENT)
Dept: NEUROLOGY | Age: 32
End: 2022-01-13

## 2022-01-15 DIAGNOSIS — G43.909 MIGRAINE WITHOUT STATUS MIGRAINOSUS, NOT INTRACTABLE, UNSPECIFIED MIGRAINE TYPE: ICD-10-CM

## 2022-01-18 RX ORDER — PROCHLORPERAZINE MALEATE 10 MG
TABLET ORAL
Qty: 28 TABLET | Refills: 0 | Status: SHIPPED
Start: 2022-01-18 | End: 2022-08-17

## 2022-01-25 ENCOUNTER — PATIENT MESSAGE (OUTPATIENT)
Dept: NEUROLOGY | Age: 32
End: 2022-01-25

## 2022-01-27 NOTE — TELEPHONE ENCOUNTER
From: Jennifer Joseph  To: Kerry Bailey  Sent: 1/25/2022 4:31 PM EST  Subject: Nurtec Prescription     Dr Navneet Maya,    I went to refill my nurtec and the pharmacy said I do not have another refill until the end of February. I know it says to take it every other day so I did not know where that logic came from. They told me to contact you because the insurance wont put it through.     Shahana Mendez

## 2022-02-01 ENCOUNTER — TELEPHONE (OUTPATIENT)
Dept: NEUROLOGY | Age: 32
End: 2022-02-01

## 2022-02-01 NOTE — TELEPHONE ENCOUNTER
[place] Denial of Nurtec - limit is 8 tablets for 25 days.  Please Advise  Electronically signed by Vidya Bain on 2/1/22 at 9:16 AM EST

## 2022-02-01 NOTE — TELEPHONE ENCOUNTER
Please appeal this. We are using it for prevention and it is every other day dosing, which would be 16 tabs per month.

## 2022-02-09 ENCOUNTER — TELEPHONE (OUTPATIENT)
Dept: NEUROLOGY | Age: 32
End: 2022-02-09

## 2022-02-09 NOTE — TELEPHONE ENCOUNTER
Per Patti 8 for 25 days is available without Authorization  Electronically signed by Shantell Uriarte on 2/9/22 at 12:26 PM EST

## 2022-03-14 ENCOUNTER — OFFICE VISIT (OUTPATIENT)
Dept: FAMILY MEDICINE CLINIC | Age: 32
End: 2022-03-14
Payer: COMMERCIAL

## 2022-03-14 VITALS
RESPIRATION RATE: 18 BRPM | DIASTOLIC BLOOD PRESSURE: 72 MMHG | HEART RATE: 75 BPM | SYSTOLIC BLOOD PRESSURE: 111 MMHG | BODY MASS INDEX: 18.13 KG/M2 | WEIGHT: 122.4 LBS | OXYGEN SATURATION: 98 % | TEMPERATURE: 98.2 F | HEIGHT: 69 IN

## 2022-03-14 DIAGNOSIS — R07.89 OTHER CHEST PAIN: ICD-10-CM

## 2022-03-14 DIAGNOSIS — I20.8 ATYPICAL ANGINA (HCC): Primary | ICD-10-CM

## 2022-03-14 DIAGNOSIS — R53.83 FATIGUE, UNSPECIFIED TYPE: ICD-10-CM

## 2022-03-14 DIAGNOSIS — I20.8 ATYPICAL ANGINA (HCC): ICD-10-CM

## 2022-03-14 DIAGNOSIS — R00.2 PALPITATIONS: ICD-10-CM

## 2022-03-14 PROBLEM — H52.10 MYOPIA: Status: ACTIVE | Noted: 2022-03-14

## 2022-03-14 LAB
HCT VFR BLD CALC: 49 % (ref 37–54)
HEMOGLOBIN: 16.1 G/DL (ref 12.5–16.5)
MCH RBC QN AUTO: 29.2 PG (ref 26–35)
MCHC RBC AUTO-ENTMCNC: 32.9 % (ref 32–34.5)
MCV RBC AUTO: 88.8 FL (ref 80–99.9)
PDW BLD-RTO: 13.2 FL (ref 11.5–15)
PLATELET # BLD: 292 E9/L (ref 130–450)
PMV BLD AUTO: 9.8 FL (ref 7–12)
RBC # BLD: 5.52 E12/L (ref 3.8–5.8)
WBC # BLD: 6.2 E9/L (ref 4.5–11.5)

## 2022-03-14 PROCEDURE — 99212 OFFICE O/P EST SF 10 MIN: CPT | Performed by: FAMILY MEDICINE

## 2022-03-14 PROCEDURE — 99213 OFFICE O/P EST LOW 20 MIN: CPT | Performed by: FAMILY MEDICINE

## 2022-03-14 PROCEDURE — 93005 ELECTROCARDIOGRAM TRACING: CPT | Performed by: FAMILY MEDICINE

## 2022-03-14 PROCEDURE — 93010 ELECTROCARDIOGRAM REPORT: CPT | Performed by: FAMILY MEDICINE

## 2022-03-14 RX ORDER — PANTOPRAZOLE SODIUM 40 MG/1
40 TABLET, DELAYED RELEASE ORAL
Qty: 90 TABLET | Refills: 1 | Status: SHIPPED | OUTPATIENT
Start: 2022-03-14

## 2022-03-14 ASSESSMENT — ENCOUNTER SYMPTOMS
NAUSEA: 0
WHEEZING: 0
VOMITING: 0
COUGH: 0
CONSTIPATION: 0
SHORTNESS OF BREATH: 1
DIARRHEA: 0

## 2022-03-14 NOTE — PROGRESS NOTES
[facility]  FAMILY MEDICINE RESIDENCY PROGRAM  DATE OF VISIT : 3/14/2022    Patient : Tierney Hollis   Age : 28 y.o.  : 1990   MRN : [id number]   ______________________________________________________________________    Chief Complaint :   Chief Complaint   Patient presents with    Shortness of Breath     angina, trouble cathching full breath; 3 days       HPI : Tierney Hollis is 28 y.o. male who presented to the clinic today for office visit. Chest pain and SOB: has been ongoing for a couple years now but used to be very scattered. Now occurring more frequently, recently had an episode that lasted about 4 days. Occurs both at rest and with exertion. Pain is substernal and epigastric with intermittent radiation down to his left shoulder and left bicep. Does endorse some palpitations, lightheadedness and dizziness during the episode. Denies any diaphoresis. Both grandparents required open heart surgery, unknown dad history, mom did have heart diseases (occurred after years of dialysis). Denies any association with foods, despite changing some things in his diet not improvement of the symptoms. Does endorse some irritabality but notes his mood has been pretty good, theres little stress going on in his life, sleep is great. No previous history of anxiety. I reviewed the patient's past medications, allergies and past medical history during this visit. Past Medical History :        Past Medical History:   Diagnosis Date    Celiac disease     Hypoglycemia     Kidney stones     Migraine headache with aura      No past surgical history on file.     Social History :  Social History     Tobacco History     Smoking Status  Current Some Day Smoker Last attempt to quit  2021 Smoking Frequency  0.25 packs/day Smoking Tobacco Type  Cigarettes    Smokeless Tobacco Use  Never Used    Tobacco Comment  smokes about once day a month          Alcohol History     Alcohol Use Status  No Drug Use     Drug Use Status  Yes Types  Marijuana (Weed)          Sexual Activity     Sexually Active  Yes Partners  Female                 Allergies :   No Known Allergies    Medication List :    Current Outpatient Medications   Medication Sig Dispense Refill    pantoprazole (PROTONIX) 40 MG tablet Take 1 tablet by mouth every morning (before breakfast) 90 tablet 1    Rimegepant Sulfate 75 MG TBDP Take 75 mg by mouth every other day No more than 1 tab in 24 hours 16 tablet 3    prochlorperazine (COMPAZINE) 10 MG tablet TAKE ONE (1) TABLET BY MOUTH EVERY 6 HOURS IF NEEDED FOR NAUSEA & VOMITING 28 tablet 0    Ubrogepant (UBRELVY) 100 MG TABS Take 100 mg by mouth daily as needed (migraine) May repeat dose in 2 hours if first ineffective; no more than 2 doses in 24 hours 48 tablet 3    Ubrogepant (UBRELVY) 100 MG TABS Take 100 mg by mouth daily as needed (migraine) May repeat dose in 2 hours if first ineffective; no more than 2 doses in 24 hours; 5 box lot [id number] exp 11/2022 (Patient not taking: Reported on 3/14/2022) 5 tablet 0     No current facility-administered medications for this visit. Review of Systems :  Review of Systems   Constitutional: Negative for chills, diaphoresis, fatigue and fever. Respiratory: Positive for shortness of breath. Negative for cough and wheezing. Cardiovascular: Positive for chest pain and palpitations. Negative for leg swelling. Gastrointestinal: Negative for constipation, diarrhea, nausea and vomiting. Neurological: Positive for dizziness and light-headedness. ______________________________________________________________________    Physical Exam :    Vitals: /72 (Site: Left Upper Arm, Position: Sitting, Cuff Size: Medium Adult)   Pulse 75   Temp 98.2 °F (36.8 °C) (Temporal)   Resp 18   Ht 5' 9\" (1.753 m)   Wt 122 lb 6.4 oz (55.5 kg)   SpO2 98%   BMI 18.08 kg/m²   Physical Exam  Vitals reviewed.    Constitutional:       General: He is not in acute distress. Appearance: Normal appearance. Cardiovascular:      Rate and Rhythm: Normal rate and regular rhythm. Pulses: Normal pulses. Heart sounds: Normal heart sounds. No murmur heard. Pulmonary:      Effort: Pulmonary effort is normal. No respiratory distress. Breath sounds: Normal breath sounds. No wheezing. Abdominal:      General: Bowel sounds are normal. There is no distension. Palpations: Abdomen is soft. Tenderness: There is no abdominal tenderness. Musculoskeletal:      Right lower leg: No edema. Left lower leg: No edema. Neurological:      Mental Status: He is alert.         ___________________    Assessment & Plan :    1. Atypical angina ([address]  2. Other chest pain  3. Palpitations  - CBC; Future  - Comprehensive Metabolic Panel; Future  - LIPID PANEL; Future  - TSH; Future  - Holter Monitor 48 Hour; Future  - EKG 12 lead; Future- NSR    4. Fatigue, unspecified type  - TSH; Future  - Hepatitis C Antibody; Future  - HIV Screen; Future      Educational materials and/or home exercises printed for patient's review and were included in patient instructions on his/her After Visit Summary and given to patient at the end of visit. Counseled regarding above diagnosis, including possible risks and complications,  especially if left uncontrolled. Counseled regarding the possible side effects, risks, benefits and alternatives to treatment; patient and/or guardian verbalizes understanding, agrees, feels comfortable with and wishes to proceed with above treatment plan. Advised patient to call with any new medication issues, and read all Rx info from pharmacy to assure aware of all possible risks and side effects of medication before taking. Reviewed age and gender appropriate health screening exams and vaccinations.   Advised patient regarding importance of keeping up with recommended health maintenance and to schedule as soon as possible if overdue, as this is important in assessing for undiagnosed pathology, especially cancer, as well as protecting against potentially harmful/life threatening disease. Patient and/or guardian verbalizes understanding and agrees with above counseling, assessment and plan. All questions answered    Additional plan and future considerations:   RTO in 1mos    Return to Office: No follow-ups on file.     Nathalie Carvajal MD   Case discussed with Dr. Ghassan Sutton

## 2022-03-14 NOTE — PROGRESS NOTES
S: 28 y.o. male presents today for Shortness of Breath (angina, trouble cathching full breath; 3 days)    Cp/sob: ongoing for years; worsening for last months; last episode lasted 4 days; happens intermittently and resolves spontaneously; associated munoz, palpitations; light headedness; at times substernal / epigastric (not related to foods); radiates up shoulder and into biceps; FH of cad      O: VS: /72 (Site: Left Upper Arm, Position: Sitting, Cuff Size: Medium Adult)   Pulse 75   Temp 98.2 °F (36.8 °C) (Temporal)   Resp 18   Ht 5' 9\" (1.753 m)   Wt 122 lb 6.4 oz (55.5 kg)   SpO2 98%   BMI 18.08 kg/m²   AAO/NAD, appropriate affect for mood  CV:  RRR, no murmur  Resp: CTAB  Abdomen: SNTND  Ext: no edema    Assessment/Plan:   1) cp /sob - EKG today; holter; labs for now; consider additional cardiac testing  2) hypokalemia - ekg; labs as ordered  RTO: 2 -3 week f/u    Attending Physician Statement  I have discussed the case, including pertinent history and exam findings with the resident. I agree with the documented assessment and plan.       Electronically signed by Val Toscano MD on 3/15/2022 at 9:00 AM

## 2022-03-14 NOTE — LETTER
[facility]  [address]  Phone: [phone]  Fax: [phone]    Irina Houser MD        March 14, 2022     Patient: Clara Ferrara   YOB: 1990   Date of Visit: 3/14/2022       To Whom It May Concern: It is my medical opinion that Poli Casillas may return to full duty immediately with no restrictions. If you have any questions or concerns, please don't hesitate to call.     Sincerely,        Irnia Houser MD

## 2022-03-15 LAB
ALBUMIN SERPL-MCNC: 5.5 G/DL (ref 3.5–5.2)
ALP BLD-CCNC: 62 U/L (ref 40–129)
ALT SERPL-CCNC: 19 U/L (ref 0–40)
ANION GAP SERPL CALCULATED.3IONS-SCNC: 18 MMOL/L (ref 7–16)
AST SERPL-CCNC: 22 U/L (ref 0–39)
BILIRUB SERPL-MCNC: 0.8 MG/DL (ref 0–1.2)
BUN BLDV-MCNC: 13 MG/DL (ref 6–20)
CALCIUM SERPL-MCNC: 10 MG/DL (ref 8.6–10.2)
CHLORIDE BLD-SCNC: 102 MMOL/L (ref 98–107)
CHOLESTEROL, TOTAL: 153 MG/DL (ref 0–199)
CO2: 22 MMOL/L (ref 22–29)
CREAT SERPL-MCNC: 0.9 MG/DL (ref 0.7–1.2)
GFR AFRICAN AMERICAN: >60
GFR NON-AFRICAN AMERICAN: >60 ML/MIN/1.73
GLUCOSE BLD-MCNC: 78 MG/DL (ref 74–99)
HDLC SERPL-MCNC: 57 MG/DL
HEPATITIS C ANTIBODY INTERPRETATION: NORMAL
HIV-1 AND HIV-2 ANTIBODIES: NORMAL
LDL CHOLESTEROL CALCULATED: 82 MG/DL (ref 0–99)
POTASSIUM SERPL-SCNC: 4 MMOL/L (ref 3.5–5)
SODIUM BLD-SCNC: 142 MMOL/L (ref 132–146)
TOTAL PROTEIN: 8.1 G/DL (ref 6.4–8.3)
TRIGL SERPL-MCNC: 72 MG/DL (ref 0–149)
TSH SERPL DL<=0.05 MIU/L-ACNC: 0.83 UIU/ML (ref 0.27–4.2)
VLDLC SERPL CALC-MCNC: 14 MG/DL

## 2022-04-18 ENCOUNTER — PATIENT MESSAGE (OUTPATIENT)
Dept: NEUROLOGY | Age: 32
End: 2022-04-18

## 2022-04-28 NOTE — TELEPHONE ENCOUNTER
From: Feliciano Son  To: Gladis Gabriel  Sent: 4/18/2022 5:05 PM EDT  Subject: Out of refills for Nurtec    Dr. Eliezer Olivarez,    Just messaging to let you know I am out of refills on nurtec until May 10th. Wanted to see if I were able to stop in the office to get more until then?     Sammie Pruitt

## 2022-05-19 NOTE — PROGRESS NOTES
239 [facility] MSN, APRN-CNP, [address], [address] [place] [address] Road      [phone]                                      Yakov Douglas is a 28 y.o. right handed male     We are following him for migraines    He presents alone and remains an excellent historian    He does not feel that preventive Nurtec is as effective as it used to be, and he has had a worsening of his headaches in the past few weeks. Having 2-3 a week. 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 He does use second dosing. He now states he always feels that he has a slight daily headache. He is not overusing OTC analgesia.     Other medications failed or contraindicated: Beta-blockers due to hypotension and hypoglycemia; topiramate due to kidney stones; failed amitriptyline; failed sumatriptan and rizatriptan    No chest pain or palpitations  No SOB  No vertigo, lightheadedness or loss of consciousness  No falls, tripping or stumbling  No incontinence of bowels or bladder  No itching or bruising appreciated  No numbness, tingling or focal arm/leg weakness  No speech or swallowing troubles    ROS otherwise negative     Medications:     Current Outpatient Medications   Medication Sig Dispense Refill    pantoprazole (PROTONIX) 40 MG tablet Take 1 tablet by mouth every morning (before breakfast) 90 tablet 1    Rimegepant Sulfate 75 MG TBDP Take 75 mg by mouth every other day No more than 1 tab in 24 hours 16 tablet 3    prochlorperazine (COMPAZINE) 10 MG tablet TAKE ONE (1) TABLET BY MOUTH EVERY 6 HOURS IF NEEDED FOR NAUSEA & VOMITING 28 tablet 0    Ubrogepant (UBRELVY) 100 MG TABS Take 100 mg by mouth daily as needed (migraine) May repeat dose in 2 hours if first ineffective; no more than 2 doses in 24 hours 48 tablet 3    Ubrogepant (UBRELVY) 100 MG TABS Take 100 mg by mouth daily as needed (migraine) May repeat dose in 2 hours if first ineffective; no more than 2 doses in 24 hours; 5 box lot [id number] exp 11/2022 (Patient not taking: Reported on 3/14/2022) 5 tablet 0     No current facility-administered medications for this visit. Objective:     /66   Pulse 74   Temp 97 °F (36.1 °C)   Wt 124 lb (56.2 kg)   SpO2 98%   BMI 18.31 kg/m²     General appearance: alert, appears stated age, cooperative and in no distress  Head: Mild tenderness to left temple and L TMJ  Eyes: conjunctivae/corneas clear; no drainage  Neck: full ROM without cervicalgia  Lungs: clear to auscultation bilaterally  Heart: regular rate and rhythm---no murmur  Extremities: normal, atraumatic, no cyanosis or edema  Pulses: 2+ and symmetric  Skin:  color, texture, turgor normal--no rashes or lesions      Mental Status: alert and oriented x 4-very pleasant    Appropriate attention/concentration  Intact fundus of knowledge  Memories intact    Speech: no dysarthria  Language: no aphasias    Cranial Nerves:  I: smell    II: visual acuity     II: visual fields Full    II: pupils NIK   III,VII: ptosis None   III,IV,VI: extraocular muscles  EOMI without nystagmus   V: mastication Normal   V: facial light touch sensation  Normal   V,VII: corneal reflex     VII: facial muscle function - upper  Normal   VII: facial muscle function - lower Normal   VIII: hearing Normal   IX: soft palate elevation  Normal   IX,X: gag reflex    XI: trapezius strength  5/5   XI: sternocleidomastoid strength 5/5   XI: neck extension strength  5/5   XII: tongue strength  Normal     Motor:  5/5 throughout  Normal bulk and tone  No drift   No abnormal movements    Sensory:  LT normal    Coordination:   FN, FFM normal    Gait:  Normal    DTR:   2+ throughout    No Ortega's    No other pathological reflexes    Laboratory/Radiology:  ry/Radiology:     No current labs or images to review    Assessment:     Episodic migraines without aura:  Increase to 8-12 headaches per month despite preventive Nurtec. He may benefit from switch to [place] for prevention. He is likely waiting too long to treat his active migraines causing the Ubrelvy to be less effective. His exam is unchanged.     Plan:     Stop Nurtec and start Qulipta 60 mg daily--samples given    Continue Ubrelvy 100 mg PRN--advised to treat his migraines sooner    Careful headache diary    Return to office in 3 months or sooner as needed    Gladis Gabriel, MISAEL - CNP, [facility]  4:17 PM  5/19/2022

## 2022-05-20 ENCOUNTER — OFFICE VISIT (OUTPATIENT)
Dept: NEUROLOGY | Age: 32
End: 2022-05-20
Payer: COMMERCIAL

## 2022-05-20 VITALS
WEIGHT: 124 LBS | OXYGEN SATURATION: 98 % | SYSTOLIC BLOOD PRESSURE: 108 MMHG | DIASTOLIC BLOOD PRESSURE: 66 MMHG | HEART RATE: 74 BPM | TEMPERATURE: 97 F | BODY MASS INDEX: 18.31 KG/M2

## 2022-05-20 DIAGNOSIS — G43.009 MIGRAINE WITHOUT AURA AND WITHOUT STATUS MIGRAINOSUS, NOT INTRACTABLE: Primary | ICD-10-CM

## 2022-05-20 PROCEDURE — 99214 OFFICE O/P EST MOD 30 MIN: CPT | Performed by: NURSE PRACTITIONER

## 2022-05-20 RX ORDER — ATOGEPANT 60 MG/1
60 TABLET ORAL DAILY
Qty: 90 TABLET | Refills: 3 | Status: SHIPPED
Start: 2022-05-20 | End: 2022-08-17

## 2022-05-20 RX ORDER — ATOGEPANT 60 MG/1
60 TABLET ORAL DAILY
Qty: 16 TABLET | Refills: 0 | COMMUNITY
Start: 2022-05-20 | End: 2022-08-17

## 2022-05-20 NOTE — PATIENT INSTRUCTIONS
Patient Education        atogepant  Pronunciation: a TOE je pant  Brand: [place]  What is the most important information I should know about atogepant? Use only as directed. Tell your doctor if you use other medicines or have othermedical conditions or allergies. What is atogepant? Atogepant is used to prevent migraine headache episodes in adults. Atogepant may also be used for purposes not listed in this medication guide. What should I discuss with my healthcare provider before taking atogepant? Tell your doctor if you have ever had:   kidney disease (or if you are on dialysis); or   liver disease. In animal studies, atogepant caused pregnancy problems such as low birth weight or birth defects. It is not known if these effects could occur in humans. [address] [facility] [address] Gretchen hernandez about the risk. Having migraine headaches during pregnancy may increase the risk of dangerously high blood pressure that can lead to medical problems in both mother and baby. The benefit of preventing migraines may outweigh any risk. Tell your doctor if you are pregnant or plan to become pregnant. Ask a doctor if it is safe to breastfeed while using this medicine. Not approved for use by anyone younger than 25years old. How should I take atogepant? Follow all directions on your prescription label and read all medication guidesor instruction sheets. Use the medicine exactly as directed. You may take atogepant with or without food. Store at room temperature away from moisture and heat. What happens if I miss a dose? Take the medicine as soon as you can, but skip the missed dose if it is almost time for your next dose. Do not take two doses at one time. What happens if I overdose? Seek emergency medical attention or call the Poison Help line at [phone]. What should I avoid while taking atogepant? Follow your doctor's instructions about any restrictions on food, beverages, oractivity.   What are the possible side effects of atogepant? Get emergency medical help if you have signs of an allergic reaction: hives; difficult breathing; swelling of your face, lips, tongue, or throat. Common side effects may include:   nausea, constipation;   feeling tired; or   weight loss. This is not a complete list of side effects and others may occur. Call your doctor for medical advice about side effects. You may report side effects toFDA at [phone]. What other drugs will affect atogepant? Tell your doctor about all your current medicines. Many drugs can affect atogepant, especially:   cyclosporine;   Dulce's wort;   an antibiotic such as clarithromycin or rifampin;   antifungal medicine such as itraconazole or ketoconazole;   antiviral medicine to treat HIV, such as efavirenz or etravirine; or   seizure medicine such as carbamazepine or phenytoin. This list is not complete and many other drugs may affect atogepant. This includes prescription and over-the-counter medicines, vitamins, andherbal products. Not all possible drug interactions are listed here. Where can I get more information? Your pharmacist can provide more information about atogepant. Remember, keep this and all other medicines out of the reach of children, never share your medicines with others, and use this medication only for the indication prescribed. Every effort has been made to ensure that the information provided by [address] is accurate, up-to-date, and complete, but no guarantee is made to that effect. Drug information contained herein may be time sensitive. [facility] information has been compiled for use by healthcare practitioners and consumers in the [place] and therefore [facility] does not warrant that uses outside of the [place] are appropriate, unless specifically indicated otherwise. [facility] drug information does not endorse drugs, diagnose patients or recommend therapy.  [facility] drug information is an informational resource designed to assist licensed healthcare practitioners in caring for their patients and/or to serve consumers viewing this service as a supplement to, and not a substitute for, the expertise, skill, knowledge and judgment of healthcare practitioners. The absence of a warning for a given drug or drug combination in no way should be construed to indicate that the drug or drug combination is safe, effective or appropriate for any given patient. [facility] does not assume any responsibility for any aspect of healthcare administered with the aid of information [facility] provides. The information contained herein is not intended to cover all possible uses, directions, precautions, warnings, drug interactions, allergic reactions, or adverse effects. If you have questions about the drugs you are taking, check with yourdoctor, nurse or pharmacist.  Copyright 5955-9040 [address]. Version: 1.01. Revision date:10/19/2021. Care instructions adapted under license by [facility] ([facility]). If you have questions about a medical condition or this instruction, always ask your healthcare professional. [place] [address] any warranty or liability for your use of this information.

## 2022-05-24 ENCOUNTER — TELEPHONE (OUTPATIENT)
Dept: NEUROLOGY | Age: 32
End: 2022-05-24

## 2022-05-24 NOTE — TELEPHONE ENCOUNTER
Key: [id number] -   PA Case ID: [id number] -   Rx #: [id number]    Approved -today  PA Case: [id number],   Status: Approved,   Coverage Starts on: 5/24/2022 12:00:00 AM, Coverage Ends on: 5/24/2023 12:00:00 AM.    [place] 60MG tablets    [employer] Form (2451 NCPDP)

## 2022-08-16 NOTE — PROGRESS NOTES
239 Replaced by [facility] MSN, APRN-CNP, [address], [address] [facility]      [phone]                                      Trudi Oneill is a 28 y.o. right handed male     We are following him for migraines    He presents alone and remains an excellent historian    Unfortunately, he had significant anorexia with [employer] and found no benefit with regards to migraine prevention. He stopped taking it about 2 weeks ago because of his appetite loss, and has noticed a worsening of his headaches, having one about every other day. 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 He is noting intermittent blurring in his left eye. No vision loss or eye pain. He had ophthalmology evaluation and contrasted brain imaging last year. He also notes intermittent feelings of chest tightness as well as difficulty catching his breath. He does note some anxiety related to work. Sleep is fair.     Migraine medications failed or contraindicated: Beta-blockers due to hypotension and hypoglycemia; topiramate due to kidney stones; failed amitriptyline; failed sumatriptan and rizatriptan    No chest pain or palpitations  No SOB  No vertigo, lightheadedness or loss of consciousness  No falls, tripping or stumbling  No incontinence of bowels or bladder  No itching or bruising appreciated  No numbness, tingling or focal arm/leg weakness  No speech or swallowing troubles    ROS otherwise negative     Medications:     Current Outpatient Medications   Medication Sig Dispense Refill    Atogepant (QULIPTA) 60 MG TABS Take 60 mg by mouth daily 90 tablet 3    Atogepant (QULIPTA) 60 MG TABS Take 60 mg by mouth daily Lot [id number] exp 11/2023 16 tablet 0    pantoprazole (PROTONIX) 40 MG tablet Take 1 tablet by mouth every morning (before breakfast) 90 tablet 1    prochlorperazine (COMPAZINE) 10 MG tablet TAKE ONE (1) TABLET BY MOUTH EVERY 6 HOURS IF NEEDED FOR NAUSEA & VOMITING 28 tablet 0    Ubrogepant (UBRELVY) 100 MG TABS Take 100 mg by mouth daily as needed (migraine) May repeat dose in 2 hours if first ineffective; no more than 2 doses in 24 hours 48 tablet 3     No current facility-administered medications for this visit.      Objective:     /77   Pulse 75   Temp 98 °F (36.7 °C)   Wt 124 lb (56.2 kg)   SpO2 99%   BMI 18.31 kg/m²     General appearance: alert, appears stated age, cooperative and in no distress  Head: Mild tenderness to left temple and occipital region  Eyes: conjunctivae/corneas clear; no drainage--fundi not well-visualized  Neck: full ROM without cervicalgia  Lungs: clear to auscultation bilaterally  Heart: regular rate and rhythm---no murmur  Extremities: normal, atraumatic, no cyanosis or edema  Pulses: 2+ and symmetric  Skin:  color, texture, turgor normal--no rashes or lesions      Mental Status: alert and oriented x 4-very pleasant    Appropriate attention/concentration  Intact fundus of knowledge  Memories intact    Speech: no dysarthria  Language: no aphasias    Cranial Nerves:  I: smell    II: visual acuity     II: visual fields Full    II: pupils NIK   III,VII: ptosis None   III,IV,VI: extraocular muscles  EOMI without nystagmus   V: mastication Normal   V: facial light touch sensation  Normal   V,VII: corneal reflex     VII: facial muscle function - upper  Normal   VII: facial muscle function - lower Normal   VIII: hearing Normal   IX: soft palate elevation  Normal   IX,X: gag reflex    XI: trapezius strength  5/5   XI: sternocleidomastoid strength 5/5   XI: neck extension strength  5/5   XII: tongue strength  Normal     Motor:  5/5 throughout  Normal bulk and tone  No drift   No abnormal movements    Sensory:  LT normal    Coordination:   FN, FFM normal    Gait:  Normal    DTR:   2+ throughout    No Ortega's    No other pathological reflexes    Laboratory/Radiology:  ry/Radiology:     No current labs or images to review    Assessment:     Chronic migraines without aura: Having about 15 headaches per month and unfortunately failed both Nurtec and Tiny Pillaik. I feel that it may be best to try IV CGRP receptor antagonism before considering different med classes or pursuing Botox. SSRI could also be considered, particularly with his suspected underlying anxiety. He may also benefit from additional backup rescue medication on top of his Giana Ebbs, for complete headache .   His exam is normal.    Plan:     -Start Vyepti infusions 100 mg every 3 mos  -Continue Ubrelvy 100 mg PRN  -Start Reyvow 50 mg PRN--if Ubrelvy ineffective  -Discussed starting SSRI--info given on Lexapro  -If he fails Vyepti, may pursue Botox  -Careful headache diary    Return to office in 4 months or sooner as needed    MISAEL Peñaloza - CNP, [facility]  8:15 AM  2022

## 2022-08-17 ENCOUNTER — OFFICE VISIT (OUTPATIENT)
Dept: NEUROLOGY | Age: 32
End: 2022-08-17
Payer: COMMERCIAL

## 2022-08-17 VITALS
TEMPERATURE: 98 F | WEIGHT: 124 LBS | OXYGEN SATURATION: 99 % | SYSTOLIC BLOOD PRESSURE: 121 MMHG | HEART RATE: 75 BPM | BODY MASS INDEX: 18.31 KG/M2 | DIASTOLIC BLOOD PRESSURE: 77 MMHG

## 2022-08-17 DIAGNOSIS — G43.009 MIGRAINE WITHOUT AURA AND WITHOUT STATUS MIGRAINOSUS, NOT INTRACTABLE: Primary | ICD-10-CM

## 2022-08-17 PROCEDURE — 99214 OFFICE O/P EST MOD 30 MIN: CPT | Performed by: NURSE PRACTITIONER

## 2022-08-17 RX ORDER — EPTINEZUMAB-JJMR 100 MG/ML
100 INJECTION INTRAVENOUS
Qty: 1 ML | Refills: 3 | Status: SHIPPED | OUTPATIENT
Start: 2022-08-17 | End: 2022-09-06 | Stop reason: ALTCHOICE

## 2022-08-17 RX ORDER — UBROGEPANT 100 MG/1
100 TABLET ORAL DAILY PRN
Qty: 48 TABLET | Refills: 3 | Status: SHIPPED | OUTPATIENT
Start: 2022-08-17

## 2022-08-24 RX ORDER — HEPARIN SODIUM (PORCINE) LOCK FLUSH IV SOLN 100 UNIT/ML 100 UNIT/ML
500 SOLUTION INTRAVENOUS PRN
Status: CANCELLED | OUTPATIENT
Start: 2022-08-24

## 2022-08-24 RX ORDER — SODIUM CHLORIDE 9 MG/ML
5-250 INJECTION, SOLUTION INTRAVENOUS PRN
Status: CANCELLED | OUTPATIENT
Start: 2022-08-24

## 2022-08-24 RX ORDER — SODIUM CHLORIDE 0.9 % (FLUSH) 0.9 %
5-40 SYRINGE (ML) INJECTION PRN
Status: CANCELLED | OUTPATIENT
Start: 2022-08-24

## 2022-08-29 ENCOUNTER — TELEPHONE (OUTPATIENT)
Dept: NEUROLOGY | Age: 32
End: 2022-08-29

## 2022-08-29 NOTE — TELEPHONE ENCOUNTER
Jayna Blunt (Key: [id number])  Rx #: [id number]  Reyvow 50MG tablets       [facility] Electronic PA Form (9881 NCPDP)  PA Case: [id number],   Status: Approved,   Coverage Starts on: 8/29/2022 12:00:00 AM, Coverage Ends on: 8/29/2023 12:00:00 AM.

## 2022-08-29 NOTE — TELEPHONE ENCOUNTER
Travis Susi (Key: [id number])  Rx #: [id number]  Reyvow 50MG tablets     Form  [employer] PA Form (8058 NCPDP)  Created  7 days ago  Sent to Plan  6 days ago  Plan Response  6 days ago  Submit Clinical Questions  6 days ago  Determination  Favorable  1 hour ago  Message from Plan  PA Case: [id number], Status: Approved, Coverage Starts on: 8/29/2022 12:00:00 AM, Coverage Ends on: 8/29/2023 12:00:00 AM.

## 2022-09-06 ENCOUNTER — TELEPHONE (OUTPATIENT)
Dept: NEUROLOGY | Age: 32
End: 2022-09-06

## 2022-09-06 RX ORDER — ERENUMAB-AOOE 70 MG/ML
70 INJECTION SUBCUTANEOUS
Qty: 3 ADJUSTABLE DOSE PRE-FILLED PEN SYRINGE | Refills: 3 | Status: SHIPPED | OUTPATIENT
Start: 2022-09-06

## 2022-12-19 ENCOUNTER — TELEPHONE (OUTPATIENT)
Dept: NEUROLOGY | Age: 32
End: 2022-12-19

## 2023-08-24 ENCOUNTER — TELEPHONE (OUTPATIENT)
Dept: NEUROLOGY | Age: 33
End: 2023-08-24

## 2025-05-27 NOTE — TELEPHONE ENCOUNTER
Patti Approval of Ubrelvy 100 mg - [id number], Valid 1/7/2022 - 1/7/2023  Electronically signed by Bakari Watt on 1/13/22 at 1:33 PM EST
No indicators present